# Patient Record
Sex: MALE | Race: WHITE | NOT HISPANIC OR LATINO | URBAN - METROPOLITAN AREA
[De-identification: names, ages, dates, MRNs, and addresses within clinical notes are randomized per-mention and may not be internally consistent; named-entity substitution may affect disease eponyms.]

---

## 2018-07-12 ENCOUNTER — EMERGENCY (EMERGENCY)
Facility: HOSPITAL | Age: 63
LOS: 0 days | Discharge: HOME | End: 2018-07-12
Attending: EMERGENCY MEDICINE | Admitting: EMERGENCY MEDICINE
Payer: COMMERCIAL

## 2018-07-12 VITALS
DIASTOLIC BLOOD PRESSURE: 89 MMHG | OXYGEN SATURATION: 100 % | HEART RATE: 87 BPM | TEMPERATURE: 98 F | SYSTOLIC BLOOD PRESSURE: 148 MMHG | RESPIRATION RATE: 18 BRPM

## 2018-07-12 VITALS — HEIGHT: 71 IN | WEIGHT: 194.01 LBS
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
## 2018-07-12 DIAGNOSIS — S89.81XA OTHER SPECIFIED INJURIES OF RIGHT LOWER LEG, INITIAL ENCOUNTER: ICD-10-CM
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
## 2018-07-12 DIAGNOSIS — S82.841A DISPLACED BIMALLEOLAR FRACTURE OF RIGHT LOWER LEG, INITIAL ENCOUNTER FOR CLOSED FRACTURE: ICD-10-CM

## 2018-07-12 PROCEDURE — 73590 X-RAY EXAM OF LOWER LEG: CPT | Mod: 26,RT

## 2018-07-12 PROCEDURE — 72190 X-RAY EXAM OF PELVIS: CPT | Mod: 26

## 2018-07-12 PROCEDURE — 99284 EMERGENCY DEPT VISIT MOD MDM: CPT

## 2018-07-12 PROCEDURE — 73630 X-RAY EXAM OF FOOT: CPT | Mod: 26,RT

## 2018-07-12 PROCEDURE — 73560 X-RAY EXAM OF KNEE 1 OR 2: CPT | Mod: 26,RT

## 2018-07-12 PROCEDURE — 73610 X-RAY EXAM OF ANKLE: CPT | Mod: 26,RT

## 2018-07-12 RX ORDER — IBUPROFEN 200 MG
600 TABLET ORAL ONCE
Qty: 0 | Refills: 0 | Status: COMPLETED | OUTPATIENT
Start: 2018-07-12 | End: 2018-07-12

## 2018-07-12 RX ORDER — ACETAMINOPHEN 500 MG
975 TABLET ORAL ONCE
Qty: 0 | Refills: 0 | Status: COMPLETED | OUTPATIENT
Start: 2018-07-12 | End: 2018-07-12

## 2018-07-12 RX ADMIN — Medication 975 MILLIGRAM(S): at 19:37

## 2018-07-12 RX ADMIN — Medication 600 MILLIGRAM(S): at 19:37

## 2018-07-12 NOTE — ED STATDOCS - ATTENDING CONTRIBUTION TO CARE
I, Rk Dennis MD, personally saw the patient with ACP.  I have personally performed a face to face diagnostic evaluation on this patient.  I have reviewed the ACP note and agree with the history, exam, and plan of care, except as noted.

## 2018-07-12 NOTE — ED STATDOCS - OBJECTIVE STATEMENT
64 y/o male with a PMHx of  HTN, HLD presents to the ED s/p fall five feet from ladder today. Pt landed on right ankle. +right ankle pain, right knee pain. Pt notes the ladder was on uneven ground, which is why the ladder fell. Denies LOC. Pt saw PMD today after fall and was sent into ED. Pt took ibuprofen today. Denies EtOH use, illicit drug use, tobacco use. NKDA. No fever or any other acute complaints at this time. 64 y/o male with a PMHx of  HTN, HLD presents to the ED s/p fall five feet from ladder today. Pt landed on right ankle. +right ankle pain, right knee pain. Pt notes the ladder was on uneven ground, which is why the ladder fell.  Pt landed stumbling on his right ankle. Denies fall to the ground, head strike, neck injury, LOC, other injuries. Pt saw PMD today after fall and was sent into ED. Pt took ibuprofen today. Denies EtOH use, illicit drug use, tobacco use. NKDA. No fever or any other acute complaints at this time.

## 2018-07-12 NOTE — ED STATDOCS - PROGRESS NOTE DETAILS
63 yr. old male PMH: HTN,HLD presents to ED with injury to right ankle and right knee after falling off ladder today. Reports his ladder was on uneven ground and feel onto foot. No head injury or LOC. Not on any blood thinners. Seen and examined by attending in Intake. Plan: X-Rays and ortho consult. Will F/U with results and re evaluate. Dianely[facility] Ortho came to see pt. SPlinted the pt and to f/u with Dr. Orozco. Was discussed with pt. Pt aware. -Julio Smith PA-C KANDY Closed reduction without sedation performed in the ED . PT will follow up with Dr. Alvarado orthopedics in 48 hours for repeat evaluation.  Splint placed and crutches provided.  I reviewed the alarm symptoms of this patient's diagnosis and discussed criteria for their return to the emergency department.  I instructed the patient to return to the emergency department with any alarm symptoms for their specific diagnosis including pain, numbness, any worsening symptoms, and any other concerns.  I instructed this patient to call their primary doctor today, to inform them of their visit to the emergency department, and to obtain a repeat evaluation in the next 24 hours.  This patient understood and agreed with our plan for follow up.  At the time of discharge this patient remained in stable condition, in no acute distress, with stable vital signs.

## 2018-07-12 NOTE — ED STATDOCS - CARE PLAN
Principal Discharge DX:	Other closed fracture of distal end of right fibula, initial encounter  Secondary Diagnosis:	Knee injury, right, initial encounter  Secondary Diagnosis:	Fall from ladder, initial encounter

## 2018-07-12 NOTE — ED STATDOCS - PHYSICAL EXAMINATION
JW Attending Exam: No obvious skull fracture, depression, hematoma, ecchymosis,  or signs of head trauma.  No palpable skull tenderness or deformity.  No campbell sign, raccoon eyes, CSF rhinorrhea, CSF otorrhea, hematotympanum, or other sign of basilar skull fracture.  No maxillary or facial ecchymosis, hematoma, deformity, or palpable tenderness.   No septal hematoma. No midline cervical, thoracic, or lumbar tenderness, step off, fracture, or deformity.  No sign of thoracic trama.  No sign of abdominal or pelvic trauma.  Right ankle and knee +TTP, 2+DP and Tibial pulses distally neurovascularly intact.

## 2018-07-12 NOTE — CONSULT NOTE ADULT - ASSESSMENT
A: 63M with Right ankle huseyin equivalent Fx    P;  NWB RLE   maintain splint keep clean and dry   informed patient that this will need surgical intervention. Pt wishes to be discharged home and schedule surgery as outpatient   elevation   cold compress  pain control   will discuss with attending on call Dr corral A: 63M with Right ankle huseyin equivalent Fx    P:  NWB RLE   maintain splint keep clean and dry   informed patient that this will need surgical intervention. Pt wishes to be discharged home and schedule surgery as outpatient   elevation   cold compress  pain control   will discuss with attending on call Dr shayna HATHAWAY in office w/. Dr corral within 1 week  Ortho stable for DC

## 2018-07-12 NOTE — CONSULT NOTE ADULT - SUBJECTIVE AND OBJECTIVE BOX
Asked by ED to evaluate 64 y/o M with right ankle pain and swelling s/p fall off ladder today this morning approx 5 feet. Pt has been ambulating since the fall.  denies head strike or Loc, denies N/T RLE.  X rays in ED show right fibula fracture with questionable widened mortise. Stress view of ankle confirms unstable mortise. no other extremity complaints     PMH: HTN, HLD    PE Right ankle   skin intact + swelling   tender over distal fibula   tender medially   FROM toes  SILT   cap refill brisk   calf soft non tender  FROM knee , non tender knee     procedure: Right ankle close reduction attempted, Trilam splint applied. pt tolerated procedure well, remained NVI    post reduction films: Right ankle Asked by ED to evaluate 64 y/o M with right ankle pain and swelling s/p fall off ladder today this morning approx 5 feet. Pt has been ambulating since the fall.  denies head strike or Loc, denies N/T RLE.  X rays in ED show right fibula fracture with questionable widened mortise. Stress view of ankle confirms unstable mortise. no other extremity complaints     PMH: HTN, HLD    PE Right ankle   skin intact + swelling   tender over distal fibula   tender medially   FROM toes  SILT   cap refill brisk   calf soft non tender  FROM knee , non tender knee     procedure: Right ankle close reduction attempted, Trilam splint applied. pt tolerated procedure well, remained NVI    post reduction films: Right ankle fx in acceptable alignment

## 2018-07-12 NOTE — ED STATDOCS - MEDICAL DECISION MAKING DETAILS
64 y/o male with a PMHx of  HTN, HLD presents to the ED s/p fall five feet from ladder today. Pt landed on right ankle. +right ankle pain, right knee pain.  VSS WNL.  Exam suggestive of ankle Fx vs knee Fx.  DDX includes strain/sprain.  XR, pain control, orthopedics consultation as required.  Symptomatic treatment.  Reassess.

## 2018-07-18 PROBLEM — I10 ESSENTIAL (PRIMARY) HYPERTENSION: Chronic | Status: ACTIVE | Noted: 2018-07-16

## 2018-07-18 PROBLEM — E78.5 HYPERLIPIDEMIA, UNSPECIFIED: Chronic | Status: ACTIVE | Noted: 2018-07-16

## 2018-07-19 ENCOUNTER — OUTPATIENT (OUTPATIENT)
Dept: OUTPATIENT SERVICES | Facility: HOSPITAL | Age: 63
LOS: 1 days | Discharge: HOME | End: 2018-07-19
Payer: COMMERCIAL

## 2018-07-19 VITALS — WEIGHT: 194.01 LBS | HEIGHT: 71 IN

## 2018-07-19 DIAGNOSIS — Z90.89 ACQUIRED ABSENCE OF OTHER ORGANS: Chronic | ICD-10-CM

## 2018-07-19 DIAGNOSIS — S82.63XA DISPLACED FRACTURE OF LATERAL MALLEOLUS OF UNSPECIFIED FIBULA, INITIAL ENCOUNTER FOR CLOSED FRACTURE: ICD-10-CM

## 2018-07-19 LAB
ABO RH CONFIRMATION: SIGNIFICANT CHANGE UP
ANION GAP SERPL CALC-SCNC: 10 MMOL/L — SIGNIFICANT CHANGE UP (ref 5–17)
APPEARANCE UR: CLEAR — SIGNIFICANT CHANGE UP
APTT BLD: 28.4 SEC — SIGNIFICANT CHANGE UP (ref 27.5–37.4)
BACTERIA # UR AUTO: ABNORMAL
BASOPHILS # BLD AUTO: 0.04 K/UL — SIGNIFICANT CHANGE UP (ref 0–0.2)
BASOPHILS NFR BLD AUTO: 0.4 % — SIGNIFICANT CHANGE UP (ref 0–2)
BILIRUB UR-MCNC: NEGATIVE — SIGNIFICANT CHANGE UP
BLD GP AB SCN SERPL QL: SIGNIFICANT CHANGE UP
BUN SERPL-MCNC: 24 MG/DL — HIGH (ref 7–23)
CALCIUM SERPL-MCNC: 9.4 MG/DL — SIGNIFICANT CHANGE UP (ref 8.5–10.1)
CHLORIDE SERPL-SCNC: 98 MMOL/L — SIGNIFICANT CHANGE UP (ref 96–108)
CO2 SERPL-SCNC: 29 MMOL/L — SIGNIFICANT CHANGE UP (ref 22–31)
COLOR SPEC: YELLOW — SIGNIFICANT CHANGE UP
COMMENT - URINE: SIGNIFICANT CHANGE UP
CREAT SERPL-MCNC: 1.07 MG/DL — SIGNIFICANT CHANGE UP (ref 0.5–1.3)
DIFF PNL FLD: ABNORMAL
EOSINOPHIL # BLD AUTO: 0.09 K/UL — SIGNIFICANT CHANGE UP (ref 0–0.5)
EOSINOPHIL NFR BLD AUTO: 0.9 % — SIGNIFICANT CHANGE UP (ref 0–6)
EPI CELLS # UR: SIGNIFICANT CHANGE UP
GLUCOSE SERPL-MCNC: 90 MG/DL — SIGNIFICANT CHANGE UP (ref 70–99)
GLUCOSE UR QL: NEGATIVE MG/DL — SIGNIFICANT CHANGE UP
HCT VFR BLD CALC: 43.5 % — SIGNIFICANT CHANGE UP (ref 39–50)
HGB BLD-MCNC: 15.1 G/DL — SIGNIFICANT CHANGE UP (ref 13–17)
IMM GRANULOCYTES NFR BLD AUTO: 1.1 % — SIGNIFICANT CHANGE UP (ref 0–1.5)
INR BLD: 1.08 RATIO — SIGNIFICANT CHANGE UP (ref 0.88–1.16)
KETONES UR-MCNC: ABNORMAL
LEUKOCYTE ESTERASE UR-ACNC: ABNORMAL
LYMPHOCYTES # BLD AUTO: 1.31 K/UL — SIGNIFICANT CHANGE UP (ref 1–3.3)
LYMPHOCYTES # BLD AUTO: 13.6 % — SIGNIFICANT CHANGE UP (ref 13–44)
MCHC RBC-ENTMCNC: 30.1 PG — SIGNIFICANT CHANGE UP (ref 27–34)
MCHC RBC-ENTMCNC: 34.7 GM/DL — SIGNIFICANT CHANGE UP (ref 32–36)
MCV RBC AUTO: 86.7 FL — SIGNIFICANT CHANGE UP (ref 80–100)
MONOCYTES # BLD AUTO: 0.98 K/UL — HIGH (ref 0–0.9)
MONOCYTES NFR BLD AUTO: 10.2 % — SIGNIFICANT CHANGE UP (ref 2–14)
NEUTROPHILS # BLD AUTO: 7.09 K/UL — SIGNIFICANT CHANGE UP (ref 1.8–7.4)
NEUTROPHILS NFR BLD AUTO: 73.8 % — SIGNIFICANT CHANGE UP (ref 43–77)
NITRITE UR-MCNC: NEGATIVE — SIGNIFICANT CHANGE UP
NRBC # BLD: 0 /100 WBCS — SIGNIFICANT CHANGE UP (ref 0–0)
PH UR: 7 — SIGNIFICANT CHANGE UP (ref 5–8)
PLATELET # BLD AUTO: 361 K/UL — SIGNIFICANT CHANGE UP (ref 150–400)
POTASSIUM SERPL-MCNC: 3.3 MMOL/L — LOW (ref 3.5–5.3)
POTASSIUM SERPL-SCNC: 3.3 MMOL/L — LOW (ref 3.5–5.3)
PROT UR-MCNC: 15 MG/DL
PROTHROM AB SERPL-ACNC: 11.7 SEC — SIGNIFICANT CHANGE UP (ref 9.8–12.7)
RBC # BLD: 5.02 M/UL — SIGNIFICANT CHANGE UP (ref 4.2–5.8)
RBC # FLD: 12.9 % — SIGNIFICANT CHANGE UP (ref 10.3–14.5)
RBC CASTS # UR COMP ASSIST: SIGNIFICANT CHANGE UP /HPF (ref 0–4)
SODIUM SERPL-SCNC: 137 MMOL/L — SIGNIFICANT CHANGE UP (ref 135–145)
SP GR SPEC: 1.01 — SIGNIFICANT CHANGE UP (ref 1.01–1.02)
TYPE + AB SCN PNL BLD: SIGNIFICANT CHANGE UP
UROBILINOGEN FLD QL: 4 MG/DL
WBC # BLD: 9.62 K/UL — SIGNIFICANT CHANGE UP (ref 3.8–10.5)
WBC # FLD AUTO: 9.62 K/UL — SIGNIFICANT CHANGE UP (ref 3.8–10.5)
WBC UR QL: SIGNIFICANT CHANGE UP

## 2018-07-19 PROCEDURE — 71046 X-RAY EXAM CHEST 2 VIEWS: CPT | Mod: 26

## 2018-07-19 PROCEDURE — 93010 ELECTROCARDIOGRAM REPORT: CPT

## 2018-07-19 NOTE — H&P PST ADULT - NSANTHOSAYNRD_GEN_A_CORE
No. RAMIRO screening performed.  STOP BANG Legend: 0-2 = LOW Risk; 3-4 = INTERMEDIATE Risk; 5-8 = HIGH Risk

## 2018-07-19 NOTE — H&P PST ADULT - ASSESSMENT
62 y/o male with fracture lateral malleolus right ankle. Pt reports "I fell off a ladder." Pt went to his PMD who sent him to  ER. Soft cast was applied in ER. Pt denies right ankle pain. Scheduled for ORIF right ankle with Dr. Galicia.    Plan  1. Stop all NSAIDS, herbal supplements and vitamins for 7 days.  2. NPO at midnight.  3. Use EZ sponges as directed  4. Use mupirocin as directed 62 y/o male with fracture lateral malleolus right ankle. Pt reports "I fell off a ladder." Pt went to his PMD who sent him to  ER. Splint was applied in ER. Pt denies right ankle pain. Scheduled for ORIF right ankle with Dr. Galicia.    Plan  1. Stop all NSAIDS, herbal supplements and vitamins for 7 days.  2. NPO at midnight.  3. Use EZ sponges as directed  4. Use mupirocin as directed

## 2018-07-19 NOTE — H&P PST ADULT - MUSCULOSKELETAL
negative detailed exam right lower extremity cast intact, toes swollen, mobile with good capillary refills right lower extremity immobilizer intact, toes swollen, mobile with good capillary refills right lower extremity immobilizer intact, toes swollen, toes mobile with good capillary refills

## 2018-07-19 NOTE — H&P PST ADULT - PMH
Fracture of ankle  fracture lateral malleolus, closed right  HLD (hyperlipidemia)    HTN (hypertension)    OA (osteoarthritis) of knee

## 2018-07-19 NOTE — H&P PST ADULT - HISTORY OF PRESENT ILLNESS
64 y/o male with fracture lateral malleolus right ankle. Pt reports "I fell off a ladder." Pt went to his PMD who sent him to  ER. Soft cast was applied in ER. Pt denies right ankle pain. Here today for PST for ORIF right ankle. 62 y/o male with fracture lateral malleolus right ankle. Pt reports "I fell off a ladder." Pt went to his PMD who sent him to  ER. Splint was applied in ER. Pt denies right ankle pain. Here today for PST for ORIF right ankle.

## 2018-07-20 RX ORDER — FENTANYL CITRATE 50 UG/ML
50 INJECTION INTRAVENOUS
Qty: 0 | Refills: 0 | Status: DISCONTINUED | OUTPATIENT
Start: 2018-07-23 | End: 2018-07-23

## 2018-07-20 RX ORDER — OXYCODONE HYDROCHLORIDE 5 MG/1
10 TABLET ORAL EVERY 6 HOURS
Qty: 0 | Refills: 0 | Status: DISCONTINUED | OUTPATIENT
Start: 2018-07-23 | End: 2018-07-23

## 2018-07-23 ENCOUNTER — OUTPATIENT (OUTPATIENT)
Dept: OUTPATIENT SERVICES | Facility: HOSPITAL | Age: 63
LOS: 1 days | Discharge: HOME | End: 2018-07-23

## 2018-07-23 VITALS
SYSTOLIC BLOOD PRESSURE: 137 MMHG | DIASTOLIC BLOOD PRESSURE: 77 MMHG | TEMPERATURE: 98 F | HEART RATE: 76 BPM | RESPIRATION RATE: 16 BRPM | OXYGEN SATURATION: 99 %

## 2018-07-23 VITALS
SYSTOLIC BLOOD PRESSURE: 111 MMHG | OXYGEN SATURATION: 100 % | HEIGHT: 70 IN | RESPIRATION RATE: 16 BRPM | DIASTOLIC BLOOD PRESSURE: 73 MMHG | WEIGHT: 190.04 LBS | TEMPERATURE: 99 F | HEART RATE: 73 BPM

## 2018-07-23 DIAGNOSIS — Z90.89 ACQUIRED ABSENCE OF OTHER ORGANS: Chronic | ICD-10-CM

## 2018-07-23 DIAGNOSIS — Z87.438 PERSONAL HISTORY OF OTHER DISEASES OF MALE GENITAL ORGANS: Chronic | ICD-10-CM

## 2018-07-23 RX ORDER — AZTREONAM 2 G
1 VIAL (EA) INJECTION
Qty: 20 | Refills: 0
Start: 2018-07-23 | End: 2018-08-01

## 2018-07-23 RX ORDER — CYCLOBENZAPRINE HYDROCHLORIDE 10 MG/1
1 TABLET, FILM COATED ORAL
Qty: 14 | Refills: 0
Start: 2018-07-23 | End: 2018-08-05

## 2018-07-23 RX ORDER — ONDANSETRON 8 MG/1
4 TABLET, FILM COATED ORAL ONCE
Qty: 0 | Refills: 0 | Status: DISCONTINUED | OUTPATIENT
Start: 2018-07-23 | End: 2018-07-23

## 2018-07-23 RX ORDER — SODIUM CHLORIDE 9 MG/ML
1000 INJECTION INTRAMUSCULAR; INTRAVENOUS; SUBCUTANEOUS
Qty: 0 | Refills: 0 | Status: DISCONTINUED | OUTPATIENT
Start: 2018-07-23 | End: 2018-08-07

## 2018-07-23 RX ORDER — SODIUM CHLORIDE 9 MG/ML
1000 INJECTION, SOLUTION INTRAVENOUS
Qty: 0 | Refills: 0 | Status: DISCONTINUED | OUTPATIENT
Start: 2018-07-23 | End: 2018-07-23

## 2018-07-23 RX ORDER — ASPIRIN/CALCIUM CARB/MAGNESIUM 324 MG
1 TABLET ORAL
Qty: 60 | Refills: 0
Start: 2018-07-23 | End: 2018-08-21

## 2018-07-23 RX ADMIN — FENTANYL CITRATE 50 MICROGRAM(S): 50 INJECTION INTRAVENOUS at 15:24

## 2018-07-23 RX ADMIN — OXYCODONE HYDROCHLORIDE 10 MILLIGRAM(S): 5 TABLET ORAL at 15:25

## 2018-07-23 RX ADMIN — SODIUM CHLORIDE 75 MILLILITER(S): 9 INJECTION, SOLUTION INTRAVENOUS at 15:28

## 2018-07-23 NOTE — BRIEF OPERATIVE NOTE - PROCEDURE
<<-----Click on this checkbox to enter Procedure Open reduction and internal fixation (ORIF) of fracture of ankle  07/23/2018    Active  [place]

## 2018-07-23 NOTE — ASU DISCHARGE PLAN (ADULT/PEDIATRIC). - NOTIFY
Pain not relieved by Medications/Swelling that continues/Numbness, color, or temperature change to extremity/Persistent Nausea and Vomiting/Bleeding that does not stop/Fever greater than 101

## 2018-07-23 NOTE — ASU PATIENT PROFILE, ADULT - PSH
History of tonsillectomy H/O undescended testicle  left, surgery to correct  History of tonsillectomy

## 2018-07-23 NOTE — ASU DISCHARGE PLAN (ADULT/PEDIATRIC). - SPECIAL INSTRUCTIONS
1. Rest/ice/elevate  2. Keep splint clean/dry  3. Pain meds prn, rx sent to pharmacy  4. NWB in splint  5. F/u with Dr Galicia in 10-14 days, call office for appt 1. Rest/ice/elevate  2. Keep splint clean/dry  3. Pain meds prn, rx sent to pharmacy  4. ASA BID for dvt ppx, Rx sent  5. NWB in splint  6.  F/u with Dr Galicia in 14 days, call office for appt

## 2018-07-23 NOTE — ASU PATIENT PROFILE, ADULT - PMH
Fracture of ankle  fracture lateral malleolus, closed right  HLD (hyperlipidemia)    HTN (hypertension)    OA (osteoarthritis) of knee Fracture of ankle  fracture lateral malleolus, closed right  HLD (hyperlipidemia)    HTN (hypertension)    OA (osteoarthritis) of knee  right knee  Undescended testicle  left

## 2018-07-23 NOTE — ASU PATIENT PROFILE, ADULT - VISION (WITH CORRECTIVE LENSES IF THE PATIENT USUALLY WEARS THEM):
Normal vision: sees adequately in most situations; can see medication labels, newsprint glasses for reading and distance/Partially impaired: cannot see medication labels or newsprint, but can see obstacles in path, and the surrounding layout; can count fingers at arm's length

## 2018-07-23 NOTE — ASU PREOP CHECKLIST - MUPIRONCIN COMMENTS
mupirocin started day of surgery mupirocin started day of surgery. patient has splint on right ankle

## 2018-07-23 NOTE — ASU DISCHARGE PLAN (ADULT/PEDIATRIC). - MEDICATION SUMMARY - MEDICATIONS TO TAKE
I will START or STAY ON the medications listed below when I get home from the hospital:    Percocet 5/325 oral tablet  -- 1 tab(s) by mouth every 6 hours, As Needed MDD:4   -- Caution federal law prohibits the transfer of this drug to any person other  than the person for whom it was prescribed.  May cause drowsiness.  Alcohol may intensify this effect.  Use care when operating dangerous machinery.  This prescription cannot be refilled.  This product contains acetaminophen.  Do not use  with any other product containing acetaminophen to prevent possible liver damage.  Using more of this medication than prescribed may cause serious breathing problems.    -- Indication: For prn pain     losartan 100 mg oral tablet  -- 1 tab(s) by mouth once a day (at bedtime)  -- Indication: For per pmd    simvastatin 20 mg oral tablet  -- 1 tab(s) by mouth once a day (at bedtime)  -- Indication: For per pmd    chlorthalidone 25 mg oral tablet  -- 1 tab(s) by mouth once a day  -- Indication: For per pmd    potassium chloride 10 mEq oral tablet, extended release  -- 1 tab(s) by mouth once a day  -- Indication: For per pmd I will START or STAY ON the medications listed below when I get home from the hospital:    Percocet 5/325 oral tablet  -- 1 tab(s) by mouth every 6 hours, As Needed MDD:4   -- Caution federal law prohibits the transfer of this drug to any person other  than the person for whom it was prescribed.  May cause drowsiness.  Alcohol may intensify this effect.  Use care when operating dangerous machinery.  This prescription cannot be refilled.  This product contains acetaminophen.  Do not use  with any other product containing acetaminophen to prevent possible liver damage.  Using more of this medication than prescribed may cause serious breathing problems.    -- Indication: For prn pain     aspirin 325 mg oral delayed release tablet  -- 1 tab(s) by mouth 2 times a day MDD:2  -- Swallow whole.  Do not crush.  Take with food or milk.    -- Indication: For Dvt ppx    losartan 100 mg oral tablet  -- 1 tab(s) by mouth once a day (at bedtime)  -- Indication: For per pmd    simvastatin 20 mg oral tablet  -- 1 tab(s) by mouth once a day (at bedtime)  -- Indication: For per pmd    chlorthalidone 25 mg oral tablet  -- 1 tab(s) by mouth once a day  -- Indication: For per pmd    potassium chloride 10 mEq oral tablet, extended release  -- 1 tab(s) by mouth once a day  -- Indication: For per pmd    cyclobenzaprine 10 mg oral tablet  -- 1 tab(s) by mouth once a day (at bedtime), As Needed MDD:1   -- May cause drowsiness.  Alcohol may intensify this effect.  Use care when operating dangerous machinery.  Obtain medical advice before taking any non-prescription drugs as some may affect the action of this medication.    -- Indication: For prn muscle spasm I will START or STAY ON the medications listed below when I get home from the hospital:    Percocet 5/325 oral tablet  -- 1 tab(s) by mouth every 6 hours, As Needed MDD:4   -- Caution federal law prohibits the transfer of this drug to any person other  than the person for whom it was prescribed.  May cause drowsiness.  Alcohol may intensify this effect.  Use care when operating dangerous machinery.  This prescription cannot be refilled.  This product contains acetaminophen.  Do not use  with any other product containing acetaminophen to prevent possible liver damage.  Using more of this medication than prescribed may cause serious breathing problems.    -- Indication: For prn pain     aspirin 325 mg oral delayed release tablet  -- 1 tab(s) by mouth 2 times a day MDD:2  -- Swallow whole.  Do not crush.  Take with food or milk.    -- Indication: For Dvt ppx    losartan 100 mg oral tablet  -- 1 tab(s) by mouth once a day (at bedtime)  -- Indication: For per pmd    simvastatin 20 mg oral tablet  -- 1 tab(s) by mouth once a day (at bedtime)  -- Indication: For per pmd    chlorthalidone 25 mg oral tablet  -- 1 tab(s) by mouth once a day  -- Indication: For per pmd    potassium chloride 10 mEq oral tablet, extended release  -- 1 tab(s) by mouth once a day  -- Indication: For per pmd    cyclobenzaprine 10 mg oral tablet  -- 1 tab(s) by mouth once a day (at bedtime), As Needed MDD:1   -- May cause drowsiness.  Alcohol may intensify this effect.  Use care when operating dangerous machinery.  Obtain medical advice before taking any non-prescription drugs as some may affect the action of this medication.    -- Indication: For prn muscle spasm    Bactrim  mg-160 mg oral tablet  -- 1 tab(s) by mouth 2 times a day MDD:2  -- Avoid prolonged or excessive exposure to direct and/or artificial sunlight while taking this medication.  Finish all this medication unless otherwise directed by prescriber.  Medication should be taken with plenty of water.    -- Indication: For prophylaxis
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
## 2018-07-26 DIAGNOSIS — S82.841A DISPLACED BIMALLEOLAR FRACTURE OF RIGHT LOWER LEG, INITIAL ENCOUNTER FOR CLOSED FRACTURE: ICD-10-CM

## 2018-07-26 DIAGNOSIS — M17.9 OSTEOARTHRITIS OF KNEE, UNSPECIFIED: ICD-10-CM

## 2018-07-26 DIAGNOSIS — Z87.891 PERSONAL HISTORY OF NICOTINE DEPENDENCE: ICD-10-CM

## 2018-07-26 DIAGNOSIS — Z80.1 FAMILY HISTORY OF MALIGNANT NEOPLASM OF TRACHEA, BRONCHUS AND LUNG: ICD-10-CM
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
## 2018-07-26 DIAGNOSIS — E78.5 HYPERLIPIDEMIA, UNSPECIFIED: ICD-10-CM

## 2018-07-26 DIAGNOSIS — I10 ESSENTIAL (PRIMARY) HYPERTENSION: ICD-10-CM
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
## 2022-07-12 ENCOUNTER — INPATIENT (INPATIENT)
Facility: HOSPITAL | Age: 67
LOS: 1 days | Discharge: ANOTHER HOSPITAL | DRG: 282 | End: 2022-07-14
Attending: HOSPITALIST | Admitting: HOSPITALIST
Payer: COMMERCIAL

## 2022-07-12 VITALS — WEIGHT: 199.96 LBS | HEIGHT: 70 IN

## 2022-07-12 DIAGNOSIS — I21.4 NON-ST ELEVATION (NSTEMI) MYOCARDIAL INFARCTION: ICD-10-CM

## 2022-07-12 DIAGNOSIS — Z90.89 ACQUIRED ABSENCE OF OTHER ORGANS: Chronic | ICD-10-CM

## 2022-07-12 DIAGNOSIS — Z87.438 PERSONAL HISTORY OF OTHER DISEASES OF MALE GENITAL ORGANS: Chronic | ICD-10-CM

## 2022-07-12 LAB
ADD ON TEST-SPECIMEN IN LAB: SIGNIFICANT CHANGE UP
ALBUMIN SERPL ELPH-MCNC: 3.8 G/DL — SIGNIFICANT CHANGE UP (ref 3.3–5)
ALP SERPL-CCNC: 91 U/L — SIGNIFICANT CHANGE UP (ref 40–120)
ALT FLD-CCNC: 124 U/L — HIGH (ref 12–78)
ANION GAP SERPL CALC-SCNC: 5 MMOL/L — SIGNIFICANT CHANGE UP (ref 5–17)
APTT BLD: 29.1 SEC — SIGNIFICANT CHANGE UP (ref 27.5–35.5)
AST SERPL-CCNC: 178 U/L — HIGH (ref 15–37)
BASOPHILS # BLD AUTO: 0.04 K/UL — SIGNIFICANT CHANGE UP (ref 0–0.2)
BASOPHILS NFR BLD AUTO: 0.2 % — SIGNIFICANT CHANGE UP (ref 0–2)
BILIRUB SERPL-MCNC: 2.6 MG/DL — HIGH (ref 0.2–1.2)
BUN SERPL-MCNC: 24 MG/DL — HIGH (ref 7–23)
CALCIUM SERPL-MCNC: 9.3 MG/DL — SIGNIFICANT CHANGE UP (ref 8.5–10.1)
CHLORIDE SERPL-SCNC: 97 MMOL/L — SIGNIFICANT CHANGE UP (ref 96–108)
CO2 SERPL-SCNC: 33 MMOL/L — HIGH (ref 22–31)
CREAT SERPL-MCNC: 1.36 MG/DL — HIGH (ref 0.5–1.3)
D DIMER BLD IA.RAPID-MCNC: 155 NG/ML DDU — SIGNIFICANT CHANGE UP
EGFR: 57 ML/MIN/1.73M2 — LOW
EOSINOPHIL # BLD AUTO: 0 K/UL — SIGNIFICANT CHANGE UP (ref 0–0.5)
EOSINOPHIL NFR BLD AUTO: 0 % — SIGNIFICANT CHANGE UP (ref 0–6)
FLUAV AG NPH QL: SIGNIFICANT CHANGE UP
FLUBV AG NPH QL: SIGNIFICANT CHANGE UP
GLUCOSE SERPL-MCNC: 123 MG/DL — HIGH (ref 70–99)
HCT VFR BLD CALC: 43.2 % — SIGNIFICANT CHANGE UP (ref 39–50)
HGB BLD-MCNC: 14.6 G/DL — SIGNIFICANT CHANGE UP (ref 13–17)
IMM GRANULOCYTES NFR BLD AUTO: 0.7 % — SIGNIFICANT CHANGE UP (ref 0–1.5)
INR BLD: 1.19 RATIO — HIGH (ref 0.88–1.16)
LYMPHOCYTES # BLD AUTO: 0.98 K/UL — LOW (ref 1–3.3)
LYMPHOCYTES # BLD AUTO: 5.8 % — LOW (ref 13–44)
MAGNESIUM SERPL-MCNC: 2.8 MG/DL — HIGH (ref 1.6–2.6)
MCHC RBC-ENTMCNC: 29.4 PG — SIGNIFICANT CHANGE UP (ref 27–34)
MCHC RBC-ENTMCNC: 33.8 GM/DL — SIGNIFICANT CHANGE UP (ref 32–36)
MCV RBC AUTO: 87.1 FL — SIGNIFICANT CHANGE UP (ref 80–100)
MONOCYTES # BLD AUTO: 2.22 K/UL — HIGH (ref 0–0.9)
MONOCYTES NFR BLD AUTO: 13.1 % — SIGNIFICANT CHANGE UP (ref 2–14)
NEUTROPHILS # BLD AUTO: 13.54 K/UL — HIGH (ref 1.8–7.4)
NEUTROPHILS NFR BLD AUTO: 80.2 % — HIGH (ref 43–77)
NT-PROBNP SERPL-SCNC: 2117 PG/ML — HIGH (ref 0–125)
PLATELET # BLD AUTO: 269 K/UL — SIGNIFICANT CHANGE UP (ref 150–400)
POTASSIUM SERPL-MCNC: 2.6 MMOL/L — CRITICAL LOW (ref 3.5–5.3)
POTASSIUM SERPL-SCNC: 2.6 MMOL/L — CRITICAL LOW (ref 3.5–5.3)
PROT SERPL-MCNC: 7.5 GM/DL — SIGNIFICANT CHANGE UP (ref 6–8.3)
PROTHROM AB SERPL-ACNC: 13.8 SEC — HIGH (ref 10.5–13.4)
RBC # BLD: 4.96 M/UL — SIGNIFICANT CHANGE UP (ref 4.2–5.8)
RBC # FLD: 13.1 % — SIGNIFICANT CHANGE UP (ref 10.3–14.5)
RSV RNA NPH QL NAA+NON-PROBE: SIGNIFICANT CHANGE UP
SARS-COV-2 RNA SPEC QL NAA+PROBE: SIGNIFICANT CHANGE UP
SODIUM SERPL-SCNC: 135 MMOL/L — SIGNIFICANT CHANGE UP (ref 135–145)
TROPONIN I, HIGH SENSITIVITY RESULT: HIGH NG/L
WBC # BLD: 16.89 K/UL — HIGH (ref 3.8–10.5)
WBC # FLD AUTO: 16.89 K/UL — HIGH (ref 3.8–10.5)

## 2022-07-12 PROCEDURE — 84145 PROCALCITONIN (PCT): CPT

## 2022-07-12 PROCEDURE — 80048 BASIC METABOLIC PNL TOTAL CA: CPT

## 2022-07-12 PROCEDURE — 85025 COMPLETE CBC W/AUTO DIFF WBC: CPT

## 2022-07-12 PROCEDURE — 99285 EMERGENCY DEPT VISIT HI MDM: CPT

## 2022-07-12 PROCEDURE — 82550 ASSAY OF CK (CPK): CPT

## 2022-07-12 PROCEDURE — C1894: CPT

## 2022-07-12 PROCEDURE — 86803 HEPATITIS C AB TEST: CPT

## 2022-07-12 PROCEDURE — 80053 COMPREHEN METABOLIC PANEL: CPT

## 2022-07-12 PROCEDURE — 85610 PROTHROMBIN TIME: CPT

## 2022-07-12 PROCEDURE — 84484 ASSAY OF TROPONIN QUANT: CPT

## 2022-07-12 PROCEDURE — 93306 TTE W/DOPPLER COMPLETE: CPT

## 2022-07-12 PROCEDURE — 93458 L HRT ARTERY/VENTRICLE ANGIO: CPT

## 2022-07-12 PROCEDURE — 80061 LIPID PANEL: CPT

## 2022-07-12 PROCEDURE — C1769: CPT

## 2022-07-12 PROCEDURE — 85730 THROMBOPLASTIN TIME PARTIAL: CPT

## 2022-07-12 PROCEDURE — 83735 ASSAY OF MAGNESIUM: CPT

## 2022-07-12 PROCEDURE — 84100 ASSAY OF PHOSPHORUS: CPT

## 2022-07-12 PROCEDURE — 85027 COMPLETE CBC AUTOMATED: CPT

## 2022-07-12 PROCEDURE — 71046 X-RAY EXAM CHEST 2 VIEWS: CPT | Mod: 26

## 2022-07-12 PROCEDURE — 36415 COLL VENOUS BLD VENIPUNCTURE: CPT

## 2022-07-12 PROCEDURE — 84132 ASSAY OF SERUM POTASSIUM: CPT

## 2022-07-12 PROCEDURE — 93010 ELECTROCARDIOGRAM REPORT: CPT

## 2022-07-12 PROCEDURE — C1887: CPT

## 2022-07-12 RX ORDER — SIMVASTATIN 20 MG/1
1 TABLET, FILM COATED ORAL
Qty: 0 | Refills: 0 | DISCHARGE

## 2022-07-12 RX ORDER — HEPARIN SODIUM 5000 [USP'U]/ML
5000 INJECTION INTRAVENOUS; SUBCUTANEOUS ONCE
Refills: 0 | Status: COMPLETED | OUTPATIENT
Start: 2022-07-12 | End: 2022-07-12

## 2022-07-12 RX ORDER — HEPARIN SODIUM 5000 [USP'U]/ML
5300 INJECTION INTRAVENOUS; SUBCUTANEOUS EVERY 6 HOURS
Refills: 0 | Status: DISCONTINUED | OUTPATIENT
Start: 2022-07-12 | End: 2022-07-14

## 2022-07-12 RX ORDER — ASPIRIN/CALCIUM CARB/MAGNESIUM 324 MG
324 TABLET ORAL ONCE
Refills: 0 | Status: COMPLETED | OUTPATIENT
Start: 2022-07-12 | End: 2022-07-12

## 2022-07-12 RX ORDER — POTASSIUM CHLORIDE 20 MEQ
10 PACKET (EA) ORAL
Refills: 0 | Status: COMPLETED | OUTPATIENT
Start: 2022-07-12 | End: 2022-07-13

## 2022-07-12 RX ORDER — ACETAMINOPHEN 500 MG
650 TABLET ORAL ONCE
Refills: 0 | Status: COMPLETED | OUTPATIENT
Start: 2022-07-12 | End: 2022-07-12

## 2022-07-12 RX ORDER — POTASSIUM CHLORIDE 20 MEQ
10 PACKET (EA) ORAL ONCE
Refills: 0 | Status: COMPLETED | OUTPATIENT
Start: 2022-07-12 | End: 2022-07-12

## 2022-07-12 RX ORDER — CLOPIDOGREL BISULFATE 75 MG/1
300 TABLET, FILM COATED ORAL ONCE
Refills: 0 | Status: COMPLETED | OUTPATIENT
Start: 2022-07-12 | End: 2022-07-12

## 2022-07-12 RX ORDER — SODIUM CHLORIDE 9 MG/ML
1000 INJECTION INTRAMUSCULAR; INTRAVENOUS; SUBCUTANEOUS ONCE
Refills: 0 | Status: COMPLETED | OUTPATIENT
Start: 2022-07-12 | End: 2022-07-12

## 2022-07-12 RX ORDER — HEPARIN SODIUM 5000 [USP'U]/ML
INJECTION INTRAVENOUS; SUBCUTANEOUS
Qty: 25000 | Refills: 0 | Status: DISCONTINUED | OUTPATIENT
Start: 2022-07-12 | End: 2022-07-13

## 2022-07-12 RX ADMIN — Medication 100 MILLIEQUIVALENT(S): at 23:11

## 2022-07-12 RX ADMIN — CLOPIDOGREL BISULFATE 300 MILLIGRAM(S): 75 TABLET, FILM COATED ORAL at 23:15

## 2022-07-12 RX ADMIN — Medication 650 MILLIGRAM(S): at 20:18

## 2022-07-12 RX ADMIN — SODIUM CHLORIDE 1000 MILLILITER(S): 9 INJECTION INTRAMUSCULAR; INTRAVENOUS; SUBCUTANEOUS at 20:18

## 2022-07-12 RX ADMIN — HEPARIN SODIUM 5000 UNIT(S): 5000 INJECTION INTRAVENOUS; SUBCUTANEOUS at 23:10

## 2022-07-12 RX ADMIN — Medication 100 MILLIEQUIVALENT(S): at 21:27

## 2022-07-12 RX ADMIN — SODIUM CHLORIDE 1000 MILLILITER(S): 9 INJECTION INTRAMUSCULAR; INTRAVENOUS; SUBCUTANEOUS at 21:07

## 2022-07-12 RX ADMIN — HEPARIN SODIUM 1000 UNIT(S)/HR: 5000 INJECTION INTRAVENOUS; SUBCUTANEOUS at 23:11

## 2022-07-12 RX ADMIN — Medication 324 MILLIGRAM(S): at 21:26

## 2022-07-12 NOTE — PROVIDER CONTACT NOTE (CRITICAL VALUE NOTIFICATION) - DATE AND TIME:
12-Jul-2022 21:04 12-Jul-2022 21:00 Prematurity, birth weight 1,250-1,499 grams, with 31 completed weeks of gestation

## 2022-07-12 NOTE — ED STATDOCS - NSICDXPASTMEDICALHX_GEN_ALL_CORE_FT
PAST MEDICAL HISTORY:  Fracture of ankle fracture lateral malleolus, closed right    HLD (hyperlipidemia)     HTN (hypertension)     OA (osteoarthritis) of knee right knee    Undescended testicle left

## 2022-07-12 NOTE — ED STATDOCS - OBJECTIVE STATEMENT
66 y/o male of PMHx of HLD, HTN, OA, and Undescended testicle left presents to the Ed complaining of heartbeat on Sunday. p has a cold sweat, body aches, notes chest pain, back pain, SOB, dilated associated cough and went to urgent care and needed more evaluation. Pt PMD is Dr. Watson. pt was a former smoker. 68 y/o male of PMHx of HLD, HTN, OA, and Undescended testicle left presents to the Ed complaining of feeling un well since Sunday. patient had palpitations, cold sweat, body aches, notes chest pain, back pain, SOB. symptoms associated cough and went to urgent care and needed more evaluation. Pt PMD is Dr. Watson. pt was a former smoker.

## 2022-07-12 NOTE — ED ADULT NURSE NOTE - OBJECTIVE STATEMENT
pt sib urgent care for irregular heart beat possible afib. c/o pain when breathing. denies sob/n/v/d/f.

## 2022-07-12 NOTE — ED STATDOCS - PROGRESS NOTE DETAILS
68 yo male former smoker, with a PMH of htn, hld presents with diaphoresis and myalgia since Sunday. Pt states that he was have a lot of pain and then had pain to his back when he took a deep breath. Was seen at urgent care and told that he was in A fib. EKG in [facility] shows normal sinus. Denies sob, cp, vomiting, fever, cough, palpitations. Will check labs, CXR and reeval. -Julio Smith PA-C Trop elevated. Will order aspirin, repeat ekg. Page placed to Dr. Watson. Will admit. -Julio Smith PA-C Spoke with Dr. Watson who wants pt admitted and heparin and 300mg plavix ordered for the pt. Pt to be admitted. -Julio Smith PA-C

## 2022-07-12 NOTE — ED STATDOCS - ATTENDING APP SHARED VISIT CONTRIBUTION OF CARE
I, Bogdan Wright DO, personally saw the patient with ACP.  I performed a substantiative portion of the visit. I personally performed a face to face diagnostic evaluation on this patient and formulated the patient plan. Free text HPI, ROS, PE, MDM documented above by myself or with the aid of a scribe and represents my findings. The case was discussed with, and handed off to ACP who followed the case through to the re-evaluation and disposition.

## 2022-07-12 NOTE — ED ADULT NURSE NOTE - NSICDXPASTSURGICALHX_GEN_ALL_CORE_FT
PAST SURGICAL HISTORY:  H/O undescended testicle left, surgery to correct    History of tonsillectomy

## 2022-07-12 NOTE — ED ADULT NURSE NOTE - NSICDXFAMILYHX_GEN_ALL_CORE_FT
Telephone Encounter by Natalie Crawford CMA at 05/10/17 04:17 PM     Author:  Natalie Crawford CMA Service:  (none) Author Type:  Certified Medical Assistant     Filed:  05/10/17 05:47 PM Encounter Date:  5/10/2017 Status:  Signed     :  Natalie Crawford CMA (Certified Medical Assistant)            Spoke with mom and she stated that she [id number] of Vyvanse is doing well but would like to increase to 20MG.[LEO.1M]  I[LEO.2M] asked[LEO.1M] if their were any additional concerns as to why she wants to increase the medication to 20mg.  Mom states that patient is still having issues. Not turning in her homework because she forgets, the medication is wearing off early, not completing assignments.   Please Advise  MESSAGE routed to[[email or web address]] JULIO CESAR Charles[[email or web address]]  Electronically Signed by:    Natalie Crawford CMA , 5/10/2017[DR1.3T]            Revision History        User Key Date/Time User Provider Type Action    > DR1.3 05/10/17 05:47 PM Natalie Crawford CMA Certified Medical Assistant Sign     DR1.2 05/10/17 05:37 PM Natalie Crawford CMA Certified Medical Assistant      DR1.1 05/10/17 04:24 PM Natalie Crawford CMA Certified Medical Assistant     M - Manual, T - Template             FAMILY HISTORY:  Father  Still living? Unknown  Family history of lung cancer, Age at diagnosis: Age Unknown

## 2022-07-12 NOTE — ED STATDOCS - CARE PLAN
Principal Discharge DX:	NSTEMI (non-ST elevation myocardial infarction)  Secondary Diagnosis:	Acute hypokalemia   1

## 2022-07-12 NOTE — ED STATDOCS - CLINICAL SUMMARY MEDICAL DECISION MAKING FREE TEXT BOX
here with likely viral syndrome given diaereses and pluripartite chest pain. rule out ACS, PE, PNA. here with possible viral syndrome, given diaphoresis and pleuritic chest pain will rule out ACS, PE, PNA.

## 2022-07-13 PROBLEM — Q53.9 UNDESCENDED TESTICLE, UNSPECIFIED: Chronic | Status: ACTIVE | Noted: 2018-07-23

## 2022-07-13 PROBLEM — S82.899A OTHER FRACTURE OF UNSPECIFIED LOWER LEG, INITIAL ENCOUNTER FOR CLOSED FRACTURE: Chronic | Status: ACTIVE | Noted: 2018-07-19

## 2022-07-13 PROBLEM — M17.10 UNILATERAL PRIMARY OSTEOARTHRITIS, UNSPECIFIED KNEE: Chronic | Status: ACTIVE | Noted: 2018-07-19

## 2022-07-13 LAB
ADD ON TEST-SPECIMEN IN LAB: SIGNIFICANT CHANGE UP
ADD ON TEST-SPECIMEN IN LAB: SIGNIFICANT CHANGE UP
ALBUMIN SERPL ELPH-MCNC: 3.1 G/DL — LOW (ref 3.3–5)
ALP SERPL-CCNC: 81 U/L — SIGNIFICANT CHANGE UP (ref 40–120)
ALT FLD-CCNC: 94 U/L — HIGH (ref 12–78)
ANION GAP SERPL CALC-SCNC: 5 MMOL/L — SIGNIFICANT CHANGE UP (ref 5–17)
ANION GAP SERPL CALC-SCNC: 6 MMOL/L — SIGNIFICANT CHANGE UP (ref 5–17)
APTT BLD: 37.5 SEC — HIGH (ref 27.5–35.5)
APTT BLD: 47.7 SEC — HIGH (ref 27.5–35.5)
AST SERPL-CCNC: 118 U/L — HIGH (ref 15–37)
BASOPHILS # BLD AUTO: 0.04 K/UL — SIGNIFICANT CHANGE UP (ref 0–0.2)
BASOPHILS NFR BLD AUTO: 0.3 % — SIGNIFICANT CHANGE UP (ref 0–2)
BILIRUB SERPL-MCNC: 2.6 MG/DL — HIGH (ref 0.2–1.2)
BUN SERPL-MCNC: 19 MG/DL — SIGNIFICANT CHANGE UP (ref 7–23)
BUN SERPL-MCNC: 21 MG/DL — SIGNIFICANT CHANGE UP (ref 7–23)
CALCIUM SERPL-MCNC: 8.3 MG/DL — LOW (ref 8.5–10.1)
CALCIUM SERPL-MCNC: 8.6 MG/DL — SIGNIFICANT CHANGE UP (ref 8.5–10.1)
CHLORIDE SERPL-SCNC: 101 MMOL/L — SIGNIFICANT CHANGE UP (ref 96–108)
CHLORIDE SERPL-SCNC: 104 MMOL/L — SIGNIFICANT CHANGE UP (ref 96–108)
CHOLEST SERPL-MCNC: 134 MG/DL — SIGNIFICANT CHANGE UP
CK SERPL-CCNC: 516 U/L — HIGH (ref 26–308)
CO2 SERPL-SCNC: 28 MMOL/L — SIGNIFICANT CHANGE UP (ref 22–31)
CO2 SERPL-SCNC: 30 MMOL/L — SIGNIFICANT CHANGE UP (ref 22–31)
CREAT SERPL-MCNC: 0.93 MG/DL — SIGNIFICANT CHANGE UP (ref 0.5–1.3)
CREAT SERPL-MCNC: 1.09 MG/DL — SIGNIFICANT CHANGE UP (ref 0.5–1.3)
EGFR: 74 ML/MIN/1.73M2 — SIGNIFICANT CHANGE UP
EGFR: 89 ML/MIN/1.73M2 — SIGNIFICANT CHANGE UP
EOSINOPHIL # BLD AUTO: 0.01 K/UL — SIGNIFICANT CHANGE UP (ref 0–0.5)
EOSINOPHIL NFR BLD AUTO: 0.1 % — SIGNIFICANT CHANGE UP (ref 0–6)
GLUCOSE SERPL-MCNC: 116 MG/DL — HIGH (ref 70–99)
GLUCOSE SERPL-MCNC: 152 MG/DL — HIGH (ref 70–99)
HCT VFR BLD CALC: 39 % — SIGNIFICANT CHANGE UP (ref 39–50)
HCT VFR BLD CALC: 39.7 % — SIGNIFICANT CHANGE UP (ref 39–50)
HCT VFR BLD CALC: 40.2 % — SIGNIFICANT CHANGE UP (ref 39–50)
HDLC SERPL-MCNC: 64 MG/DL — SIGNIFICANT CHANGE UP
HGB BLD-MCNC: 13.2 G/DL — SIGNIFICANT CHANGE UP (ref 13–17)
HGB BLD-MCNC: 13.3 G/DL — SIGNIFICANT CHANGE UP (ref 13–17)
HGB BLD-MCNC: 13.4 G/DL — SIGNIFICANT CHANGE UP (ref 13–17)
IMM GRANULOCYTES NFR BLD AUTO: 0.8 % — SIGNIFICANT CHANGE UP (ref 0–1.5)
INR BLD: 1.31 RATIO — HIGH (ref 0.88–1.16)
LIPID PNL WITH DIRECT LDL SERPL: 56 MG/DL — SIGNIFICANT CHANGE UP
LYMPHOCYTES # BLD AUTO: 0.79 K/UL — LOW (ref 1–3.3)
LYMPHOCYTES # BLD AUTO: 5.8 % — LOW (ref 13–44)
MCHC RBC-ENTMCNC: 29.2 PG — SIGNIFICANT CHANGE UP (ref 27–34)
MCHC RBC-ENTMCNC: 29.4 PG — SIGNIFICANT CHANGE UP (ref 27–34)
MCHC RBC-ENTMCNC: 29.7 PG — SIGNIFICANT CHANGE UP (ref 27–34)
MCHC RBC-ENTMCNC: 33.3 GM/DL — SIGNIFICANT CHANGE UP (ref 32–36)
MCHC RBC-ENTMCNC: 33.5 GM/DL — SIGNIFICANT CHANGE UP (ref 32–36)
MCHC RBC-ENTMCNC: 33.8 GM/DL — SIGNIFICANT CHANGE UP (ref 32–36)
MCV RBC AUTO: 87.3 FL — SIGNIFICANT CHANGE UP (ref 80–100)
MCV RBC AUTO: 87.8 FL — SIGNIFICANT CHANGE UP (ref 80–100)
MCV RBC AUTO: 88.2 FL — SIGNIFICANT CHANGE UP (ref 80–100)
MONOCYTES # BLD AUTO: 1.24 K/UL — HIGH (ref 0–0.9)
MONOCYTES NFR BLD AUTO: 9.1 % — SIGNIFICANT CHANGE UP (ref 2–14)
NEUTROPHILS # BLD AUTO: 11.43 K/UL — HIGH (ref 1.8–7.4)
NEUTROPHILS NFR BLD AUTO: 83.9 % — HIGH (ref 43–77)
NON HDL CHOLESTEROL: 69 MG/DL — SIGNIFICANT CHANGE UP
PLATELET # BLD AUTO: 217 K/UL — SIGNIFICANT CHANGE UP (ref 150–400)
PLATELET # BLD AUTO: 225 K/UL — SIGNIFICANT CHANGE UP (ref 150–400)
PLATELET # BLD AUTO: 233 K/UL — SIGNIFICANT CHANGE UP (ref 150–400)
POTASSIUM SERPL-MCNC: 2.7 MMOL/L — CRITICAL LOW (ref 3.5–5.3)
POTASSIUM SERPL-MCNC: 2.8 MMOL/L — CRITICAL LOW (ref 3.5–5.3)
POTASSIUM SERPL-MCNC: 3.1 MMOL/L — LOW (ref 3.5–5.3)
POTASSIUM SERPL-SCNC: 2.7 MMOL/L — CRITICAL LOW (ref 3.5–5.3)
POTASSIUM SERPL-SCNC: 2.8 MMOL/L — CRITICAL LOW (ref 3.5–5.3)
POTASSIUM SERPL-SCNC: 3.1 MMOL/L — LOW (ref 3.5–5.3)
PROT SERPL-MCNC: 6.5 GM/DL — SIGNIFICANT CHANGE UP (ref 6–8.3)
PROTHROM AB SERPL-ACNC: 15.2 SEC — HIGH (ref 10.5–13.4)
RBC # BLD: 4.44 M/UL — SIGNIFICANT CHANGE UP (ref 4.2–5.8)
RBC # BLD: 4.55 M/UL — SIGNIFICANT CHANGE UP (ref 4.2–5.8)
RBC # BLD: 4.56 M/UL — SIGNIFICANT CHANGE UP (ref 4.2–5.8)
RBC # FLD: 13.2 % — SIGNIFICANT CHANGE UP (ref 10.3–14.5)
SODIUM SERPL-SCNC: 136 MMOL/L — SIGNIFICANT CHANGE UP (ref 135–145)
SODIUM SERPL-SCNC: 138 MMOL/L — SIGNIFICANT CHANGE UP (ref 135–145)
TRIGL SERPL-MCNC: 68 MG/DL — SIGNIFICANT CHANGE UP
TROPONIN I, HIGH SENSITIVITY RESULT: HIGH NG/L
TROPONIN I, HIGH SENSITIVITY RESULT: HIGH NG/L
WBC # BLD: 12.92 K/UL — HIGH (ref 3.8–10.5)
WBC # BLD: 13.62 K/UL — HIGH (ref 3.8–10.5)
WBC # BLD: 14.12 K/UL — HIGH (ref 3.8–10.5)
WBC # FLD AUTO: 12.92 K/UL — HIGH (ref 3.8–10.5)
WBC # FLD AUTO: 13.62 K/UL — HIGH (ref 3.8–10.5)
WBC # FLD AUTO: 14.12 K/UL — HIGH (ref 3.8–10.5)

## 2022-07-13 PROCEDURE — 93306 TTE W/DOPPLER COMPLETE: CPT | Mod: 26

## 2022-07-13 PROCEDURE — 12345: CPT | Mod: NC

## 2022-07-13 PROCEDURE — 99223 1ST HOSP IP/OBS HIGH 75: CPT

## 2022-07-13 RX ORDER — CLOPIDOGREL BISULFATE 75 MG/1
75 TABLET, FILM COATED ORAL DAILY
Refills: 0 | Status: DISCONTINUED | OUTPATIENT
Start: 2022-07-14 | End: 2022-07-14

## 2022-07-13 RX ORDER — NITROGLYCERIN 6.5 MG
0.4 CAPSULE, EXTENDED RELEASE ORAL ONCE
Refills: 0 | Status: DISCONTINUED | OUTPATIENT
Start: 2022-07-13 | End: 2022-07-14

## 2022-07-13 RX ORDER — HEPARIN SODIUM 5000 [USP'U]/ML
1500 INJECTION INTRAVENOUS; SUBCUTANEOUS
Qty: 25000 | Refills: 0 | Status: DISCONTINUED | OUTPATIENT
Start: 2022-07-13 | End: 2022-07-14

## 2022-07-13 RX ORDER — ATORVASTATIN CALCIUM 80 MG/1
80 TABLET, FILM COATED ORAL AT BEDTIME
Refills: 0 | Status: DISCONTINUED | OUTPATIENT
Start: 2022-07-13 | End: 2022-07-14

## 2022-07-13 RX ORDER — CYCLOBENZAPRINE HYDROCHLORIDE 10 MG/1
5 TABLET, FILM COATED ORAL AT BEDTIME
Refills: 0 | Status: DISCONTINUED | OUTPATIENT
Start: 2022-07-13 | End: 2022-07-14

## 2022-07-13 RX ORDER — POTASSIUM CHLORIDE 20 MEQ
10 PACKET (EA) ORAL
Refills: 0 | Status: COMPLETED | OUTPATIENT
Start: 2022-07-13 | End: 2022-07-13

## 2022-07-13 RX ORDER — SIMVASTATIN 20 MG/1
40 TABLET, FILM COATED ORAL AT BEDTIME
Refills: 0 | Status: DISCONTINUED | OUTPATIENT
Start: 2022-07-13 | End: 2022-07-13

## 2022-07-13 RX ORDER — ASPIRIN/CALCIUM CARB/MAGNESIUM 324 MG
81 TABLET ORAL DAILY
Refills: 0 | Status: DISCONTINUED | OUTPATIENT
Start: 2022-07-14 | End: 2022-07-14

## 2022-07-13 RX ORDER — ONDANSETRON 8 MG/1
4 TABLET, FILM COATED ORAL EVERY 8 HOURS
Refills: 0 | Status: DISCONTINUED | OUTPATIENT
Start: 2022-07-13 | End: 2022-07-14

## 2022-07-13 RX ORDER — LIDOCAINE 4 G/100G
1 CREAM TOPICAL DAILY
Refills: 0 | Status: DISCONTINUED | OUTPATIENT
Start: 2022-07-13 | End: 2022-07-14

## 2022-07-13 RX ORDER — ACETAMINOPHEN 500 MG
650 TABLET ORAL EVERY 6 HOURS
Refills: 0 | Status: DISCONTINUED | OUTPATIENT
Start: 2022-07-13 | End: 2022-07-14

## 2022-07-13 RX ORDER — SODIUM CHLORIDE 9 MG/ML
250 INJECTION INTRAMUSCULAR; INTRAVENOUS; SUBCUTANEOUS ONCE
Refills: 0 | Status: COMPLETED | OUTPATIENT
Start: 2022-07-14 | End: 2022-07-14

## 2022-07-13 RX ORDER — LOSARTAN POTASSIUM 100 MG/1
100 TABLET, FILM COATED ORAL DAILY
Refills: 0 | Status: DISCONTINUED | OUTPATIENT
Start: 2022-07-13 | End: 2022-07-14

## 2022-07-13 RX ORDER — TRAMADOL HYDROCHLORIDE 50 MG/1
25 TABLET ORAL EVERY 6 HOURS
Refills: 0 | Status: DISCONTINUED | OUTPATIENT
Start: 2022-07-13 | End: 2022-07-14

## 2022-07-13 RX ORDER — POTASSIUM CHLORIDE 20 MEQ
40 PACKET (EA) ORAL EVERY 4 HOURS
Refills: 0 | Status: COMPLETED | OUTPATIENT
Start: 2022-07-13 | End: 2022-07-13

## 2022-07-13 RX ORDER — POLYETHYLENE GLYCOL 3350 17 G/17G
17 POWDER, FOR SOLUTION ORAL DAILY
Refills: 0 | Status: DISCONTINUED | OUTPATIENT
Start: 2022-07-13 | End: 2022-07-14

## 2022-07-13 RX ORDER — LANOLIN ALCOHOL/MO/W.PET/CERES
3 CREAM (GRAM) TOPICAL AT BEDTIME
Refills: 0 | Status: DISCONTINUED | OUTPATIENT
Start: 2022-07-13 | End: 2022-07-14

## 2022-07-13 RX ADMIN — TRAMADOL HYDROCHLORIDE 25 MILLIGRAM(S): 50 TABLET ORAL at 01:00

## 2022-07-13 RX ADMIN — Medication 100 MILLIEQUIVALENT(S): at 11:07

## 2022-07-13 RX ADMIN — HEPARIN SODIUM 5300 UNIT(S): 5000 INJECTION INTRAVENOUS; SUBCUTANEOUS at 13:22

## 2022-07-13 RX ADMIN — Medication 40 MILLIEQUIVALENT(S): at 14:43

## 2022-07-13 RX ADMIN — HEPARIN SODIUM 1500 UNIT(S)/HR: 5000 INJECTION INTRAVENOUS; SUBCUTANEOUS at 20:08

## 2022-07-13 RX ADMIN — HEPARIN SODIUM 1250 UNIT(S)/HR: 5000 INJECTION INTRAVENOUS; SUBCUTANEOUS at 05:42

## 2022-07-13 RX ADMIN — LIDOCAINE 1 PATCH: 4 CREAM TOPICAL at 22:39

## 2022-07-13 RX ADMIN — HEPARIN SODIUM 1500 UNIT(S)/HR: 5000 INJECTION INTRAVENOUS; SUBCUTANEOUS at 19:18

## 2022-07-13 RX ADMIN — HEPARIN SODIUM 1700 UNIT(S)/HR: 5000 INJECTION INTRAVENOUS; SUBCUTANEOUS at 20:30

## 2022-07-13 RX ADMIN — Medication 100 MILLIEQUIVALENT(S): at 13:07

## 2022-07-13 RX ADMIN — Medication 100 MILLIEQUIVALENT(S): at 00:17

## 2022-07-13 RX ADMIN — Medication 40 MILLIEQUIVALENT(S): at 11:07

## 2022-07-13 RX ADMIN — Medication 100 MILLIEQUIVALENT(S): at 01:22

## 2022-07-13 RX ADMIN — LOSARTAN POTASSIUM 100 MILLIGRAM(S): 100 TABLET, FILM COATED ORAL at 09:21

## 2022-07-13 RX ADMIN — HEPARIN SODIUM 5300 UNIT(S): 5000 INJECTION INTRAVENOUS; SUBCUTANEOUS at 06:16

## 2022-07-13 RX ADMIN — Medication 100 MILLIEQUIVALENT(S): at 12:08

## 2022-07-13 RX ADMIN — Medication 40 MILLIEQUIVALENT(S): at 01:22

## 2022-07-13 RX ADMIN — HEPARIN SODIUM 1250 UNIT(S)/HR: 5000 INJECTION INTRAVENOUS; SUBCUTANEOUS at 07:05

## 2022-07-13 RX ADMIN — ATORVASTATIN CALCIUM 80 MILLIGRAM(S): 80 TABLET, FILM COATED ORAL at 21:20

## 2022-07-13 RX ADMIN — LIDOCAINE 1 PATCH: 4 CREAM TOPICAL at 19:42

## 2022-07-13 RX ADMIN — LIDOCAINE 1 PATCH: 4 CREAM TOPICAL at 11:42

## 2022-07-13 RX ADMIN — Medication 650 MILLIGRAM(S): at 18:06

## 2022-07-13 RX ADMIN — Medication 40 MILLIEQUIVALENT(S): at 06:17

## 2022-07-13 RX ADMIN — HEPARIN SODIUM 1500 UNIT(S)/HR: 5000 INJECTION INTRAVENOUS; SUBCUTANEOUS at 13:48

## 2022-07-13 RX ADMIN — TRAMADOL HYDROCHLORIDE 25 MILLIGRAM(S): 50 TABLET ORAL at 00:38

## 2022-07-13 NOTE — H&P ADULT - NSHPSOCIALHISTORY_GEN_ALL_CORE
Lives with his wife. Works in retail. Independent with ADLs and IADLs. Smoked 1/4 ppd for 5 years, quit 10 years ago. Denies alcohol and drug use.

## 2022-07-13 NOTE — H&P ADULT - NSHPPHYSICALEXAM_GEN_ALL_CORE
ICU Vital Signs Last 24 Hrs  T(C): 37 (12 Jul 2022 23:51), Max: 37 (12 Jul 2022 23:51)  T(F): 98.6 (12 Jul 2022 23:51), Max: 98.6 (12 Jul 2022 23:51)  HR: 70 (12 Jul 2022 23:51) (67 - 70)  BP: 117/98 (12 Jul 2022 23:51) (116/76 - 137/76)  BP(mean): 85 (12 Jul 2022 23:15) (85 - 95)  RR: 17 (12 Jul 2022 23:51) (16 - 18)  SpO2: 96% (12 Jul 2022 23:51) (95% - 100%)    O2 Parameters below as of 12 Jul 2022 23:51  Patient On (Oxygen Delivery Method): room air    General: Awake and alert, cooperative with exam. No acute distress.   Skin: Warm, dry, and pink.   Eyes: Pupils equal and reactive to light. Extraocular eye movements intact. No conjunctival injection, discharge, or scleral icterus.   HEENT: Atraumatic, normocephalic. Moist mucus membranes.   Cardiology: Normal S1, S2. No murmurs, rubs, or gallops. Regular rate and rhythm. No tenderness on palpation of the chest.   Respiratory: Lungs clear to ascultation bilaterally. Good air exchange. No wheezes, rales, or rhonchi. Normal chest expansion.   Gastrointestinal: Positive bowel sounds. Soft, non-tender, non-distended. No guarding, rigidity, or rebound tenderness. No hepatosplenomegaly.   Musculoskeletal: 5/5 motor strength in all extremities. Normal range of motion.   Extremities: No peripheral edema bilaterally. Dorsalis pedis pulses 2+ bilaterally.   Neurological: A+Ox3 (person, place, and time). Cranial nerves 2-12 intact. Normal speech. No facial droop. No focal neurological deficits.   Psychiatric: Normal affect. Normal mood.

## 2022-07-13 NOTE — CHART NOTE - NSCHARTNOTEFT_GEN_A_CORE
Cardiac Cath Lab Nurse Practitioner :  HPI: 67y Male with a past medical history of hypertension hyperlipidemia admitted for non-STEMI.  Cardiac enzymes trending down.  Symptoms started on Sunday, likely initial event.      -plan for cardiac cath for ischemic work up 7/14 AM  - NPO after midnight x meds in AM   - IVF 250cc bolus at 8am 7/14  - Cath NP team to follow in AM Cardiac Cath Lab Nurse Practitioner :  HPI: 67y Male with a past medical history of hypertension hyperlipidemia admitted for non-STEMI.  Cardiac enzymes trending down.  Symptoms started on Sunday, likely initial event.    -plan for cardiac cath for ischemic work up 7/14 AM  - NPO after midnight x meds in AM   - IVF 250cc bolus at 8am 7/14  - noted K 2.8 today and repleted; repeat now and in AM  - Cath NP team to follow in AM

## 2022-07-13 NOTE — CONSULT NOTE ADULT - SUBJECTIVE AND OBJECTIVE BOX
Patient is a 67y old  Male who presents with a chief complaint of Abnormal EKG (13 Jul 2022 00:23)    ________________________________  GRIS VERAS is a 67y year old Male with a past medical history of hypertension, hyperlipidemia, history of hypokalemia potassium supplementation who is also on a diuretic for hypertension, history of OA, undescended left testicle s/p surgery presented with abnormal EKG.    Patient stated on Sunday he woke up from a nap with profuse diaphoresis.  He also has been having shortness of breath, was seen by Dr Mendosa  earlier this year and advised to have a stress test for shortness of breath. he did not follow-up for stress test.  Given his shortness of breath, he went to an urgent care and was sent to the ER for further evaluation for possible inferior wall MI.    In the ER, EKG was essentially unchanged.  Cardiac enzymes were elevated.  K was low.  ________________________________  Review of systems: A 10 point review of system has been performed, and is negative except for what has been mentioned in the above history of present illness.     PAST MEDICAL & SURGICAL HISTORY:  HTN (hypertension)      HLD (hyperlipidemia)      OA (osteoarthritis) of knee  right knee      Fracture of ankle  fracture lateral malleolus, closed right      Undescended testicle  left      History of tonsillectomy      H/O undescended testicle  left, surgery to correct        FAMILY HISTORY:  Family history of lung cancer (Father)  father - lung ca and heart disease    SOCIAL HISTORY: The patient denies any history of tobacco abuse, alcohol abuse or illicit drug use.    ALLERGIES:  Bee sting -  swelling (Hives)  No Known Drug Allergies    Home Medications:  chlorthalidone 25 mg oral tablet: 1 tab(s) orally once a day (13 Jul 2022 00:11)  losartan 100 mg oral tablet: 1 tab(s) orally once a day (at bedtime) (13 Jul 2022 00:11)  Motrin  mg oral tablet: 2 tab(s) orally once a day (13 Jul 2022 00:11)  potassium chloride 10 mEq oral tablet, extended release: 1 tab(s) orally once a day (13 Jul 2022 00:11)  simvastatin 40 mg oral tablet: 1 tab(s) orally once a day (at bedtime) (13 Jul 2022 00:11)    MEDICATIONS  (STANDING):  heparin  Infusion. 1500 Unit(s)/Hr (15 mL/Hr) IV Continuous <Continuous>  lidocaine   4% Patch 1 Patch Transdermal daily  losartan 100 milliGRAM(s) Oral daily  simvastatin 40 milliGRAM(s) Oral at bedtime    MEDICATIONS  (PRN):  acetaminophen     Tablet .. 650 milliGRAM(s) Oral every 6 hours PRN Temp greater or equal to 38C (100.4F), Mild Pain (1 - 3)  aluminum hydroxide/magnesium hydroxide/simethicone Suspension 30 milliLiter(s) Oral every 4 hours PRN Dyspepsia  cyclobenzaprine 5 milliGRAM(s) Oral at bedtime PRN Spasm  heparin   Injectable 5300 Unit(s) IV Push every 6 hours PRN For aPTT less than 40  melatonin 3 milliGRAM(s) Oral at bedtime PRN Insomnia  nitroglycerin     SubLingual 0.4 milliGRAM(s) SubLingual once PRN Chest Pain  ondansetron Injectable 4 milliGRAM(s) IV Push every 8 hours PRN Nausea and/or Vomiting  polyethylene glycol 3350 17 Gram(s) Oral daily PRN Constipation  traMADol 25 milliGRAM(s) Oral every 6 hours PRN Moderate Pain (4 - 6)    Vital Signs Last 24 Hrs  T(C): 36.9 (13 Jul 2022 07:16), Max: 37 (12 Jul 2022 23:51)  T(F): 98.4 (13 Jul 2022 07:16), Max: 98.6 (12 Jul 2022 23:51)  HR: 73 (13 Jul 2022 07:16) (67 - 73)  BP: 136/76 (13 Jul 2022 07:16) (116/76 - 137/76)  BP(mean): 90 (13 Jul 2022 07:16) (85 - 95)  RR: 18 (13 Jul 2022 07:16) (16 - 18)  SpO2: 96% (12 Jul 2022 23:51) (95% - 100%)    Parameters below as of 13 Jul 2022 07:16  Patient On (Oxygen Delivery Method): room air      I&O's Summary    ________________________________  GENERAL APPEARANCE:  No acute distress  HEAD: normocephalic, atraumatic  NECK: supple, no jugular venous distention, no carotid bruit    HEART: Regular rate and rhythm, S1, S2 normal, 1/6 murmur    CHEST:  No anterior chest wall tenderness    LUNGS:  Clear to auscultation, without any wheezing, rhonchi or rales    ABDOMEN: soft, nontender, nondistended, with positive bowel sounds appreciated  EXTREMITIES: no edema.   NEURO: Alert and oriented x3  PSYC:  Normal affect  SKIN:  Dry  ________________________________   TELEMETRY: Sinus rhythm with PVCs    ECG: Sinus rhythm old inferior wall MI    LABS:                        13.4   13.62 )-----------( 225      ( 13 Jul 2022 09:16 )             40.2             07-13    x   |  x   |  x   ----------------------------<  x   2.8<LL>   |  x   |  x     Ca    8.3<L>      13 Jul 2022 04:53  Mg     2.5     07-13    TPro  6.5  /  Alb  3.1<L>  /  TBili  2.6<H>  /  DBili  x   /  AST  118<H>  /  ALT  94<H>  /  AlkPhos  81  07-13      Lipid Panel  Chl 134  HDL 64  LDL --  Trg 68  LIVER FUNCTIONS - ( 13 Jul 2022 04:53 )  Alb: 3.1 g/dL / Pro: 6.5 gm/dL / ALK PHOS: 81 U/L / ALT: 94 U/L / AST: 118 U/L / GGT: x         PT/INR - ( 13 Jul 2022 09:16 )   PT: 15.2 sec;   INR: 1.31 ratio         PTT - ( 13 Jul 2022 12:32 )  PTT:35.6 sec    07-13 @ 04:53  Trop-I  --  CK      516  CK-MB   --    Pro BNP  2117 07-12 @ 20:15  D Dimer  155 07-12 @ 20:15    PT/INR - ( 13 Jul 2022 09:16 )   PT: 15.2 sec;   INR: 1.31 ratio       PTT - ( 13 Jul 2022 12:32 )  PTT:35.6 sec    CARDIAC MARKERS ( 13 Jul 2022 04:53 )  x     / x     / 516 U/L / x     / x         ________________________________    RADIOLOGY & ADDITIONAL STUDIES:  CXR Clear  ________________________________    ASSESSMENT:  Non-STEMI  Hypertension  Hyperlipidemia    PLAN:  In summary, this is a 67y Male with a past medical history of hypertension hyperlipidemia admitted for non-STEMI.  Cardiac enzymes trending down.  Symptoms started on Sunday, likely initial event.  Patient likely due to myocardial Q waves inferiorly.  Obtain echo.  Continue with full dose anticoagulation.  Plan for coronary angiogram in the morning.  Risk and benefit discussed with patient.  He would like to proceed.  Continue antiplatelet therapy.  Continue statin but switch to Lipitor 80 mg daily.  ____________________________________________  (Dragon Dictation software used). Thank you for allowing me to participate in the care of your patient. Please contact me should any questions arise.    LILIANA Lakhani DO, [facility]  Office: [phone]

## 2022-07-13 NOTE — H&P ADULT - NSHPREVIEWOFSYSTEMS_GEN_ALL_CORE
Constitutional: negative for fatigue, negative for fever, negative for chills, negative for decreased appetite.  Skin: negative for rashes, negative for open wounds, negative for jaundice.   Eyes: negative for blurry vision, negative for double vision.   Ears, nose, throat: negative for ear pain, negative for nasal congestion, negative for sore throat, negative for lymph node swelling.   Cardiovascular: negative for chest pain, negative for palpitations, negative for lower extremity swelling.   Respiratory: negative for shortness of breath, negative for wheezing, negative for cough.   Gastrointestinal: negative for abdominal pain, negative for nausea, negative for vomiting, negative for diarrhea, negative for constipation, negative for blood in the stool, negative for black tarry stools.   Genitourinary: negative for burning on urination, negative for urinary urgency or frequency, negative for blood in the urine.   Endocrine: negative for cold intolerance, negative for heat intolerance, negative for increased thirst.   Hematologic: negative for easy bruising or bleeding.   Musculoskeletal: positive for muscle/joint pain, negative for decreased range of motion.   Neurological: negative for dizziness, negative for headaches, negative for loss of consciousness, negative for motor weakness, negative for sensory deficits.   Psychiatric: negative for depression, negative for anxiety.

## 2022-07-13 NOTE — PROGRESS NOTE ADULT - ASSESSMENT
69 yo man with HTN, HLD, presented for evaluation after episode of diaphoresis and neck discomfort, found to have markedly elevated troponin in ER. EKG with SR, signs of old inf wall MI. Admitted to [facility]    NSTEMI  Now asymptomatic. Trops downtrending. No significant events on tele. CXR clear. TTE done. Left ventricle is normal in size, wall thickness, wall motion and contractility. Estimated left ventricular ejection fraction is 55-60 %. Normal appearing right ventricle structure and function. No evidence of pericardial effusion. Now planned for cardiac cath.  - Cath in AM  - Continue antiplatelet, statin, heparin drip    Hypokalemia  Could be related to patient's home chlorthalidone. Stopped the medication.   - Continue to replete potassium, trend K. Note Mg WNL

## 2022-07-13 NOTE — PROGRESS NOTE ADULT - SUBJECTIVE AND OBJECTIVE BOX
Chief Complaint: Diaphoresis, neck pain    Interval Hx: patient seen and examined. Now asymptomatic. No further episodes of diaphoresis. No neck pain. No chest pain or tightness. No dyspnea, lightheadedness or palpitations. No other complaints. K improved to 3.1 with supplementation. Cardiology is anticipating cardiac cath tomorrow, 7/14.     ROS: Multi system review is comprehensively negative x 10 systems except as above    Vitals:  T(F): 99.7 (13 Jul 2022 19:15), Max: 99.7 (13 Jul 2022 19:15)  HR: 80 (13 Jul 2022 19:15) (67 - 80)  BP: 105/54 (13 Jul 2022 19:15) (105/54 - 136/76)  RR: 18 (13 Jul 2022 19:15) (16 - 18)  SpO2: 96% (13 Jul 2022 19:15) (95% - 96%) on room air    Exam:  GENERAL APPEARANCE:  No acute distress  HEAD: normocephalic, atraumatic  NECK: supple, no jugular venous distention, no carotid bruit  HEART: Regular rate and rhythm, S1, S2 normal, 1/6 murmur  CHEST:  No anterior chest wall tenderness  LUNGS:  Clear to auscultation, without any wheezing, rhonchi or rales  ABDOMEN: soft, nontender, nondistended, with positive bowel sounds appreciated  EXTREMITIES: no edema.   NEURO: Alert and oriented x3  PSYCH:  Normal affect  SKIN: Warm, dry, intact    Labs:               13.2   14.12 )-----------( 233                 39.0       136  |  101  |  21  --------------------<  152  3.1   |  30  |  1.09    Ca 8.6       Mg  2.5         TPro  6.5  /  Alb  3.1  /  TBili  2.6  /  DBili  x   /  AST  118  /  ALT  94  /  AlkPhos  81      PT: 15.2 sec;   INR: 1.31 ratio    PTT:47.7 sec    Troponin 15,757 --> 16,966 --> 13,542    Pro BNP 2117    Micro:  COVID19, flu and RSV PCR negative    Imaging:  CXR 7/12: Clear lungs, borderline enlarged cardiac silhouette    Cardiac Testing:  TTE 7/13:  Mild to Moderate mitral regurgitation is present. Mild aortic sclerosis is present with normal valvular opening. Mild (1+) aortic regurgitation is present. Mild (1+) tricuspid valve regurgitation is present. The left ventricle is normal in size, wall thickness, wall motion and contractility. Estimated left ventricular ejection fraction is 55-60 %. Normal appearing right ventricle structure and function. No evidence of pericardial effusion.    TELEMETRY 7/13: Sinus rhythm with PVCs    ECG 7/12: Sinus rhythm old inferior wall MI    Meds:  MEDICATIONS  (STANDING):  atorvastatin 80 milliGRAM(s) Oral at bedtime  heparin  Infusion. 1500 Unit(s)/Hr (15 mL/Hr) IV Continuous <Continuous>  lidocaine   4% Patch 1 Patch Transdermal daily  losartan 100 milliGRAM(s) Oral daily    MEDICATIONS  (PRN):  acetaminophen     Tablet .. 650 milliGRAM(s) Oral every 6 hours PRN Temp greater or equal to 38C (100.4F), Mild Pain (1 - 3)  aluminum hydroxide/magnesium hydroxide/simethicone Suspension 30 milliLiter(s) Oral every 4 hours PRN Dyspepsia  cyclobenzaprine 5 milliGRAM(s) Oral at bedtime PRN Spasm  heparin   Injectable 5300 Unit(s) IV Push every 6 hours PRN For aPTT less than 40  melatonin 3 milliGRAM(s) Oral at bedtime PRN Insomnia  nitroglycerin     SubLingual 0.4 milliGRAM(s) SubLingual once PRN Chest Pain  ondansetron Injectable 4 milliGRAM(s) IV Push every 8 hours PRN Nausea and/or Vomiting  polyethylene glycol 3350 17 Gram(s) Oral daily PRN Constipation  traMADol 25 milliGRAM(s) Oral every 6 hours PRN Moderate Pain (4 - 6)

## 2022-07-13 NOTE — H&P ADULT - HISTORY OF PRESENT ILLNESS
68 y/o M with PMH HTN, HLD, hypokalemia, fractured ankle, OA, undescended left testicle s/p surgery presented with abnormal EKG. The pt states on Sunday he woke up from a nap and was sweating. He woke up the next day and had neck pain. 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 Yesterday his neck pain worsened and he went to a walk in. He was told he was in A fib and was encouraged to come to the ER for evaluation. The pt states that he never had any chest pain. Reports occasional SOB. Denies fevers, chills, chest pain, SOB, abdominal pain, N/V, diarrhea/constipation.     ER course: VSS. Labs: WBC 16.89, neutrophils 80.2%, INR 1.19, K+ 2.6, CO2 33, Cr 1.36 (baseline ~1.07 in 2018), glucose 123, total bilirubin 2.6, , , Troponin 45007.03, BNP 2117. COVID negative, Influenza A/B negative, RSV negative.     - EKG from urgent care: P wave present, not A fib, LVH (personally reviewed).   - 1st EKG in ER: NSR with HR 67 bpm, normal intervals, LVH, no ST segment changes, no T wave inversions (personally reviewed).   - 2nd EKG in ER: NSR with HR 72 bpm, normal intervals, LVH, no ST segment changes, no T wave inversions (personally reviewed).     Imaging:  - CXR: no consolidation, no effusion, no pneumothorax (personally reviewed).     Pt was given 324 mg of aspirin, 300 mg of Plavix, 1L of NS, Tylenol, 3 K-riders of KCl, started on a heparin drip. He is being admitted to telemetry for further management.

## 2022-07-13 NOTE — H&P ADULT - ASSESSMENT
66 y/o M presents with abnormal EKG     1. Elevated troponin likely secondary to NSTEMI vs. demand ischemia  - Admit to telemetry   - EKG: no ST segment changes or T wave inversions (no evidence of A fib on EKGs, will monitor on telemetry)   - No current active episodes of chest pain   - Troponin 88003.03, will trend 2 more troponins   - s/p 325 mg of aspirin in the ER, c/w 81 mg of aspirin daily   - s/p 300 mg of Plavix loading dose, c/w 75 mg daily   - c/w home medications: statin, ACE inhibitor   - Monitor off beta blocker for now given elevated BNP   - Ordered ECHO, lipid panel   - Zofran and Maalox for nausea, Nitroglycerin PRN for chest pain   - NPO after midnight for possiblel cath in AM   - Heparin drip   - Pain control: Tylenol, Morphine PRN    - Cardiology consult - Dr. Watson     2. Elevated BNP   - BNP 2117, pt is fluid overloaded on exam   - Ordered ECHO   - Ordered procalcitonin to r/o superimposed bacterial PNA   - Strict I+Os, daily weights   - 2L H20 restriction, 2g sodium restriction      - Keep K > 4 and Mg > 2      3. Leukocytosis   - WBC 16.89, trend   - Tylenol PRN for fevers   - If pt spikes a temperature, will pan-culture   - No evidence of PNA on CXR, COVID/influenza/RSV negative   - Ordered UA     4. Hypokalemia   - K+ 2.6, s/p 3 K-riders of 10 meq in the ER   - Will give 40 mg PO KCl x 2   - Magnesium WNL, f/u AM K+     5. Elevated Cr   - Cr 1.36 (baseline ~1.07 in 2018), monitor closely   - Monitor off IVF given elevated BNP   - Avoid nephrotoxic medications   - Strict I+Os, daily weights     6. Hyperglycemia   - Glucose 123, monitor closely     7. Hyperbilirubinemia, elevated transaminases   - Total bilirubin 2.6, , , trend  - If persistent elevation in transaminases, will order a RUQ US    8. History of HTN, HLD, OA, undescended left testicle s/p surgery  - c/w home medications; verified with pt at the bedside   - Neck pain -> flexeril, tramadol, lidocaine, ice -> if persisting will order CT neck    DVT ppx: Heparin drip   Code status: Full code (Pt agrees to chest compressions and intubation if required).

## 2022-07-14 VITALS
RESPIRATION RATE: 18 BRPM | DIASTOLIC BLOOD PRESSURE: 62 MMHG | SYSTOLIC BLOOD PRESSURE: 131 MMHG | TEMPERATURE: 98 F | OXYGEN SATURATION: 94 % | HEART RATE: 89 BPM

## 2022-07-14 LAB
ADD ON TEST-SPECIMEN IN LAB: SIGNIFICANT CHANGE UP
ADD ON TEST-SPECIMEN IN LAB: SIGNIFICANT CHANGE UP
ANION GAP SERPL CALC-SCNC: 5 MMOL/L — SIGNIFICANT CHANGE UP (ref 5–17)
APTT BLD: 60.1 SEC — HIGH (ref 27.5–35.5)
BUN SERPL-MCNC: 23 MG/DL — SIGNIFICANT CHANGE UP (ref 7–23)
CALCIUM SERPL-MCNC: 8.5 MG/DL — SIGNIFICANT CHANGE UP (ref 8.5–10.1)
CHLORIDE SERPL-SCNC: 101 MMOL/L — SIGNIFICANT CHANGE UP (ref 96–108)
CO2 SERPL-SCNC: 30 MMOL/L — SIGNIFICANT CHANGE UP (ref 22–31)
CREAT SERPL-MCNC: 1 MG/DL — SIGNIFICANT CHANGE UP (ref 0.5–1.3)
EGFR: 82 ML/MIN/1.73M2 — SIGNIFICANT CHANGE UP
GLUCOSE SERPL-MCNC: 100 MG/DL — HIGH (ref 70–99)
HCT VFR BLD CALC: 39.2 % — SIGNIFICANT CHANGE UP (ref 39–50)
HGB BLD-MCNC: 13 G/DL — SIGNIFICANT CHANGE UP (ref 13–17)
MCHC RBC-ENTMCNC: 29.3 PG — SIGNIFICANT CHANGE UP (ref 27–34)
MCHC RBC-ENTMCNC: 33.2 GM/DL — SIGNIFICANT CHANGE UP (ref 32–36)
MCV RBC AUTO: 88.3 FL — SIGNIFICANT CHANGE UP (ref 80–100)
PLATELET # BLD AUTO: 231 K/UL — SIGNIFICANT CHANGE UP (ref 150–400)
POTASSIUM SERPL-MCNC: 2.9 MMOL/L — CRITICAL LOW (ref 3.5–5.3)
POTASSIUM SERPL-SCNC: 2.9 MMOL/L — CRITICAL LOW (ref 3.5–5.3)
RBC # BLD: 4.44 M/UL — SIGNIFICANT CHANGE UP (ref 4.2–5.8)
RBC # FLD: 13.2 % — SIGNIFICANT CHANGE UP (ref 10.3–14.5)
SODIUM SERPL-SCNC: 136 MMOL/L — SIGNIFICANT CHANGE UP (ref 135–145)
WBC # BLD: 12.04 K/UL — HIGH (ref 3.8–10.5)
WBC # FLD AUTO: 12.04 K/UL — HIGH (ref 3.8–10.5)

## 2022-07-14 PROCEDURE — 99239 HOSP IP/OBS DSCHRG MGMT >30: CPT

## 2022-07-14 RX ORDER — CHLORTHALIDONE 50 MG
1 TABLET ORAL
Qty: 0 | Refills: 0 | DISCHARGE

## 2022-07-14 RX ORDER — POTASSIUM CHLORIDE 20 MEQ
10 PACKET (EA) ORAL
Refills: 0 | Status: COMPLETED | OUTPATIENT
Start: 2022-07-14 | End: 2022-07-14

## 2022-07-14 RX ORDER — ASPIRIN/CALCIUM CARB/MAGNESIUM 324 MG
1 TABLET ORAL
Qty: 0 | Refills: 0 | DISCHARGE
Start: 2022-07-14

## 2022-07-14 RX ORDER — POTASSIUM CHLORIDE 20 MEQ
1 PACKET (EA) ORAL
Qty: 0 | Refills: 0 | DISCHARGE

## 2022-07-14 RX ORDER — TRAMADOL HYDROCHLORIDE 50 MG/1
0.5 TABLET ORAL
Qty: 0 | Refills: 0 | DISCHARGE
Start: 2022-07-14

## 2022-07-14 RX ORDER — LOSARTAN POTASSIUM 100 MG/1
1 TABLET, FILM COATED ORAL
Qty: 0 | Refills: 0 | DISCHARGE

## 2022-07-14 RX ORDER — SIMVASTATIN 20 MG/1
1 TABLET, FILM COATED ORAL
Qty: 0 | Refills: 0 | DISCHARGE

## 2022-07-14 RX ORDER — SODIUM CHLORIDE 9 MG/ML
1000 INJECTION INTRAMUSCULAR; INTRAVENOUS; SUBCUTANEOUS
Refills: 0 | Status: DISCONTINUED | OUTPATIENT
Start: 2022-07-14 | End: 2022-07-14

## 2022-07-14 RX ORDER — HEPARIN SODIUM 5000 [USP'U]/ML
15 INJECTION INTRAVENOUS; SUBCUTANEOUS
Qty: 0 | Refills: 0 | DISCHARGE
Start: 2022-07-14

## 2022-07-14 RX ORDER — LOSARTAN POTASSIUM 100 MG/1
1 TABLET, FILM COATED ORAL
Qty: 0 | Refills: 0 | DISCHARGE
Start: 2022-07-14

## 2022-07-14 RX ORDER — ACETAMINOPHEN 500 MG
2 TABLET ORAL
Qty: 0 | Refills: 0 | DISCHARGE
Start: 2022-07-14

## 2022-07-14 RX ORDER — POTASSIUM CHLORIDE 20 MEQ
40 PACKET (EA) ORAL EVERY 4 HOURS
Refills: 0 | Status: DISCONTINUED | OUTPATIENT
Start: 2022-07-14 | End: 2022-07-14

## 2022-07-14 RX ORDER — POTASSIUM PHOSPHATE, MONOBASIC POTASSIUM PHOSPHATE, DIBASIC 236; 224 MG/ML; MG/ML
30 INJECTION, SOLUTION INTRAVENOUS ONCE
Refills: 0 | Status: COMPLETED | OUTPATIENT
Start: 2022-07-14 | End: 2022-07-14

## 2022-07-14 RX ORDER — ATORVASTATIN CALCIUM 80 MG/1
1 TABLET, FILM COATED ORAL
Qty: 0 | Refills: 0 | DISCHARGE
Start: 2022-07-14

## 2022-07-14 RX ORDER — POLYETHYLENE GLYCOL 3350 17 G/17G
17 POWDER, FOR SOLUTION ORAL
Qty: 0 | Refills: 0 | DISCHARGE
Start: 2022-07-14

## 2022-07-14 RX ORDER — CYCLOBENZAPRINE HYDROCHLORIDE 10 MG/1
1 TABLET, FILM COATED ORAL
Qty: 0 | Refills: 0 | DISCHARGE
Start: 2022-07-14

## 2022-07-14 RX ORDER — IBUPROFEN 200 MG
2 TABLET ORAL
Qty: 0 | Refills: 0 | DISCHARGE

## 2022-07-14 RX ORDER — CHLORHEXIDINE GLUCONATE 213 G/1000ML
1 SOLUTION TOPICAL
Refills: 0 | Status: DISCONTINUED | OUTPATIENT
Start: 2022-07-14 | End: 2022-07-14

## 2022-07-14 RX ADMIN — POTASSIUM PHOSPHATE, MONOBASIC POTASSIUM PHOSPHATE, DIBASIC 83.33 MILLIMOLE(S): 236; 224 INJECTION, SOLUTION INTRAVENOUS at 12:57

## 2022-07-14 RX ADMIN — SODIUM CHLORIDE 250 MILLILITER(S): 9 INJECTION INTRAMUSCULAR; INTRAVENOUS; SUBCUTANEOUS at 09:06

## 2022-07-14 RX ADMIN — Medication 40 MILLIEQUIVALENT(S): at 08:39

## 2022-07-14 RX ADMIN — SODIUM CHLORIDE 180 MILLILITER(S): 9 INJECTION INTRAMUSCULAR; INTRAVENOUS; SUBCUTANEOUS at 11:21

## 2022-07-14 RX ADMIN — HEPARIN SODIUM 1700 UNIT(S)/HR: 5000 INJECTION INTRAVENOUS; SUBCUTANEOUS at 07:07

## 2022-07-14 RX ADMIN — Medication 100 MILLIEQUIVALENT(S): at 11:21

## 2022-07-14 RX ADMIN — Medication 81 MILLIGRAM(S): at 12:56

## 2022-07-14 RX ADMIN — Medication 100 MILLIEQUIVALENT(S): at 12:20

## 2022-07-14 RX ADMIN — Medication 40 MILLIEQUIVALENT(S): at 12:03

## 2022-07-14 RX ADMIN — HEPARIN SODIUM 1700 UNIT(S)/HR: 5000 INJECTION INTRAVENOUS; SUBCUTANEOUS at 02:54

## 2022-07-14 RX ADMIN — Medication 100 MILLIEQUIVALENT(S): at 10:22

## 2022-07-14 RX ADMIN — LOSARTAN POTASSIUM 100 MILLIGRAM(S): 100 TABLET, FILM COATED ORAL at 12:56

## 2022-07-14 NOTE — PROGRESS NOTE ADULT - SUBJECTIVE AND OBJECTIVE BOX
Chief Complaint: Diaphoresis, neck pain    Interval Hx: Patient seen and examined. Now asymptomatic. No further episodes of diaphoresis. No neck pain. No chest pain or tightness. No dyspnea, lightheadedness or palpitations. No other complaints. Patient still hypokalemic, presumably due to his recent chlorthalidone. Also hypophosphatemic today. Cardiology performed LHC this afternoon and patient was found to have 3-vessel disease, recommended transfer to tertiary care center for CABG evaluation. Patient to go to [facility]. Awaiting transfer.     ROS: Multi system review is comprehensively negative x 10 systems except as above    Vitals:  T(F): 97.7 (14 Jul 2022 12:57), Max: 99.7 (13 Jul 2022 19:15)  HR: 77 (14 Jul 2022 12:57) (63 - 80)  BP: 123/84 (14 Jul 2022 12:57) (105/54 - 149/92)  RR: 18 (14 Jul 2022 12:57) (16 - 18)  SpO2: 97% (14 Jul 2022 12:57) (96% - 98%) on room air    Exam:  GENERAL APPEARANCE:  No acute distress  HEAD: Normocephalic, atraumatic  NECK: Supple, no jugular venous distention, no carotid bruit  HEART: Regular rate and rhythm, S1, S2 normal, 1/6 murmur  CHEST: No anterior chest wall tenderness. Lungs clear to auscultation, without any wheezing, rhonchi or rales  ABDOMEN: Soft, nontender, nondistended, with positive bowel sounds appreciated  EXTREMITIES: No edema. No tenderness.   SKIN: Warm, dry, intact  NEURO: Alert and oriented x3. No gross deficits.   PSYCH:  Normal affect    Labs:                       13.0   12.04 )---------( 231             39.2       136  |  101  |  23  ---------------------< 100  2.9   |   30   |  1.00    Ca 8.5  Phos 1.4   Mg 2.8    TPro  6.5  /  Alb  3.1<L>  /  TBili  2.6<H>  /  DBili  x   /  AST  118<H>  /  ALT  94<H>  /  AlkPhos  81  07-13    CARDIAC MARKERS ( 13 Jul 2022 04:53 )  x     / x     / 516 U/L / x     / x          LIVER FUNCTIONS - ( 13 Jul 2022 04:53 )  Alb: 3.1 g/dL / Pro: 6.5 gm/dL / ALK PHOS: 81 U/L / ALT: 94 U/L / AST: 118 U/L / GGT: x           PT/INR - ( 13 Jul 2022 09:16 )   PT: 15.2 sec;   INR: 1.31 ratio         PTT - ( 14 Jul 2022 02:15 )  PTT:60.1 sec                                  TPro  6.5  /  Alb  3.1  /  TBili  2.6  /  DBili  x   /  AST  118  /  ALT  94  /  AlkPhos  81      PT: 15.2 sec;   INR: 1.31 ratio    PTT:47.7 sec    Troponin 15,757 --> 16,966 --> 13,542    Pro BNP 2117    Micro:  COVID19, flu and RSV PCR negative    Imaging:  CXR 7/12: Clear lungs, borderline enlarged cardiac silhouette    Cardiac Testing:  TTE 7/13:  Mild to Moderate mitral regurgitation is present. Mild aortic sclerosis is present with normal valvular opening. Mild (1+) aortic regurgitation is present. Mild (1+) tricuspid valve regurgitation is present. The left ventricle is normal in size, wall thickness, wall motion and contractility. Estimated left ventricular ejection fraction is 55-60 %. Normal appearing right ventricle structure and function. No evidence of pericardial effusion.    TELEMETRY 7/13: Sinus rhythm with PVCs    ECG 7/12: Sinus rhythm old inferior wall MI    Meds:  MEDICATIONS  (STANDING):  atorvastatin 80 milliGRAM(s) Oral at bedtime  heparin  Infusion. 1500 Unit(s)/Hr (15 mL/Hr) IV Continuous <Continuous>  lidocaine   4% Patch 1 Patch Transdermal daily  losartan 100 milliGRAM(s) Oral daily    MEDICATIONS  (PRN):  acetaminophen     Tablet .. 650 milliGRAM(s) Oral every 6 hours PRN Temp greater or equal to 38C (100.4F), Mild Pain (1 - 3)  aluminum hydroxide/magnesium hydroxide/simethicone Suspension 30 milliLiter(s) Oral every 4 hours PRN Dyspepsia  cyclobenzaprine 5 milliGRAM(s) Oral at bedtime PRN Spasm  heparin   Injectable 5300 Unit(s) IV Push every 6 hours PRN For aPTT less than 40  melatonin 3 milliGRAM(s) Oral at bedtime PRN Insomnia  nitroglycerin     SubLingual 0.4 milliGRAM(s) SubLingual once PRN Chest Pain  ondansetron Injectable 4 milliGRAM(s) IV Push every 8 hours PRN Nausea and/or Vomiting  polyethylene glycol 3350 17 Gram(s) Oral daily PRN Constipation  traMADol 25 milliGRAM(s) Oral every 6 hours PRN Moderate Pain (4 - 6)         Chief Complaint: Diaphoresis, neck pain    Interval Hx: Patient seen and examined. Now asymptomatic. No further episodes of diaphoresis. No neck pain. No chest pain or tightness. No dyspnea, lightheadedness or palpitations. No other complaints. Patient still hypokalemic, presumably due to his recent chlorthalidone. Also hypophosphatemic today. Cardiology performed LHC this afternoon and patient was found to have 3-vessel disease, recommended transfer to tertiary care center for CABG evaluation. Patient to go to [facility]. Awaiting transfer.     ROS: Multi system review is comprehensively negative x 10 systems except as above    Vitals:  T(F): 97.7 (14 Jul 2022 12:57), Max: 99.7 (13 Jul 2022 19:15)  HR: 77 (14 Jul 2022 12:57) (63 - 80)  BP: 123/84 (14 Jul 2022 12:57) (105/54 - 149/92)  RR: 18 (14 Jul 2022 12:57) (16 - 18)  SpO2: 97% (14 Jul 2022 12:57) (96% - 98%) on room air    Exam:  GENERAL APPEARANCE:  No acute distress  HEAD: Normocephalic, atraumatic  NECK: Supple, no jugular venous distention, no carotid bruit  HEART: Regular rate and rhythm, S1, S2 normal, 1/6 murmur  CHEST: No anterior chest wall tenderness. Lungs clear to auscultation, without any wheezing, rhonchi or rales  ABDOMEN: Soft, nontender, nondistended, with positive bowel sounds appreciated  EXTREMITIES: No edema. No tenderness.   SKIN: Warm, dry, intact  NEURO: Alert and oriented x3. No gross deficits.   PSYCH:  Normal affect    Labs:                       13.0   12.04 )---------( 231             39.2       136  |  101  |  23  ---------------------< 100  2.9   |   30   |  1.00    Ca 8.5  Phos 1.4   Mg 2.8    TPro  6.5  /  Alb  3.1  /  TBili  2.6  /  DBili  x   /  AST  118  /  ALT  94  /  AlkPhos  81     PT: 15.2 sec;   INR: 1.31 ratio  PTT: 60.1 sec    Troponin 15,757 --> 16,966 --> 13,542     proBNP 2117    Micro:  COVID19, flu and RSV PCR negative    Imaging:  CXR 7/12: Clear lungs, borderline enlarged cardiac silhouette    Cardiac Testing:  [facility] 7/14: 3-vessel CAD. Full report pending.    TTE 7/13:  Mild to moderate mitral regurgitation. Mild aortic sclerosis with normal valvular opening. Mild aortic regurgitation. Mild tricuspid valve regurgitation. Left ventricle normal in size, wall thickness, wall motion and contractility. Estimated left ventricular ejection fraction is 55-60 %. Normal appearing right ventricle structure and function. No evidence of pericardial effusion.    Tele 7/13: Sinus rhythm with PVCs    ECG 7/12: Sinus rhythm old inferior wall MI    Meds:  MEDICATIONS  (STANDING):  aspirin  chewable 81 milliGRAM(s) Oral daily  atorvastatin 80 milliGRAM(s) Oral at bedtime  chlorhexidine 4% Liquid 1 Application(s) Topical <User Schedule>  heparin  Infusion. 1500 Unit(s)/Hr (15 mL/Hr) IV Continuous <Continuous>  lidocaine   4% Patch 1 Patch Transdermal daily  losartan 100 milliGRAM(s) Oral daily  potassium chloride    Tablet ER 40 milliEquivalent(s) Oral every 4 hours  sodium chloride 0.9%. 1000 milliLiter(s) (180 mL/Hr) IV Continuous <Continuous>    MEDICATIONS  (PRN):  acetaminophen     Tablet .. 650 milliGRAM(s) Oral every 6 hours PRN Temp greater or equal to 38C (100.4F), Mild Pain (1 - 3)  aluminum hydroxide/magnesium hydroxide/simethicone Suspension 30 milliLiter(s) Oral every 4 hours PRN Dyspepsia  cyclobenzaprine 5 milliGRAM(s) Oral at bedtime PRN Spasm  heparin   Injectable 5300 Unit(s) IV Push every 6 hours PRN For aPTT less than 40  melatonin 3 milliGRAM(s) Oral at bedtime PRN Insomnia  nitroglycerin     SubLingual 0.4 milliGRAM(s) SubLingual once PRN Chest Pain  ondansetron Injectable 4 milliGRAM(s) IV Push every 8 hours PRN Nausea and/or Vomiting  polyethylene glycol 3350 17 Gram(s) Oral daily PRN Constipation  traMADol 25 milliGRAM(s) Oral every 6 hours PRN Moderate Pain (4 - 6)

## 2022-07-14 NOTE — PROGRESS NOTE ADULT - ASSESSMENT
69 yo man with HTN, HLD, presented for evaluation after episode of diaphoresis and neck discomfort, found to have markedly elevated troponin in ER. EKG with SR, signs of old inf wall MI. Admitted to [facility]    NSTEMI  Now asymptomatic. Trops downtrending. No significant events on tele. CXR clear. TTE done. Left ventricle is normal in size, wall thickness, wall motion and contractility. Estimated left ventricular ejection fraction is 55-60 %. Normal appearing right ventricle structure and function. No evidence of pericardial effusion. Now planned for cardiac cath.  - Cath in AM  - Continue antiplatelet, statin, heparin drip    Hypokalemia  Could be related to patient's home chlorthalidone. Stopped the medication.   - Continue to replete potassium, trend K. Note Mg WNL   68 year-old man with HTN, HLD, presented for evaluation after episode of diaphoresis and neck discomfort, found to have markedly elevated troponin in ER. EKG with SR, signs of old inf wall MI. Admitted to [facility].    NSTEMI  Now asymptomatic. Trops downtrending. No significant events on tele. CXR clear. TTE done. Left ventricle normal in size, wall thickness, wall motion and contractility. Estimated left ventricular ejection fraction 55-60%. Normal appearing right ventricle structure and function. No evidence of pericardial effusion. Underwent L heart cardiac cath today with findings of 3-vessel CAD, referred for CABG. Patient for transfer to [facility].   - Continue antiplatelet, statin, heparin drip  - CABG evaluation at [facility]    Hypokalemia  Could be related to patient's home chlorthalidone. Stopped the medication. Ordered for potassium supplementation. Note Mg level is WNL.   - Continue to replete potassium, trend potassium level    Hypophosphatemia  Phos 1.4 on routine labs today. Ordered for K-phos 30mmol.  - Continue to replete phos, trend phos level     HTN  BP controlled. BP 120s-80s today. On losartan.  - Continue losartan

## 2022-07-14 NOTE — CHART NOTE - NSCHARTNOTEFT_GEN_A_CORE
[facility] risks, benefits and alternatives discussed with patient. Risk discussed included, but not limited to MI, stroke, mortality, major bleeding, arrythmia, or infection. Consent obtained and signed educational material provided. Pt. verbalizes and understands pre-procedural instructions.  ASA: II   Bleeding Risk Score: 0.9  Creatinine: 0.93  GFR:  89  Julius score ROBERTO risk score 1 point

## 2022-07-14 NOTE — DISCHARGE NOTE PROVIDER - NSDCCPCAREPLAN_GEN_ALL_CORE_FT
PRINCIPAL DISCHARGE DIAGNOSIS  Diagnosis: NSTEMI (non-ST elevation myocardial infarction)  Assessment and Plan of Treatment: You were admitted to the hospital for diaphoresis (sweats) and neck discomfort, found to have non ST elevation myocardial infarction, a heart attack. Reassuringly, you are now comfortable, cardiac bloodwork enzymes (troponin) are down-trending, there have been no new cardiac events on the heart monitor, lungs are clear on exam and chest xray. Echocardiogram was performed and showed normal left and right ventricular heart function. Today you underwent a coronary angiogram that revealed multi vessel coronary disease and you have been recommened to receive inpatient evaluation for coronary artery bypass surgery. Stable for transfer to [facility]. Continue aspirin, statin, heparin drip.      SECONDARY DISCHARGE DIAGNOSES  Diagnosis: Hypokalemia  Assessment and Plan of Treatment: Of note, your bloodwork this admission has been significant for low potassium levels, likely related to your use of chlorthalidone as a blood pressure medication. We have stopped the medication and ordered  potassium supplementation. You will continue to need potassium supplementation and labwork to trend your potassium level once at [facility].    Diagnosis: Hypophosphatemia  Assessment and Plan of Treatment: Of note, your bloodwork this admission has been significant for low phosphorus levels. Today you were ordered for phosphorus supplementation. You will need repeat labwork to trend your phosphorus level once at [facility].      Diagnosis: HTN (hypertension)  Assessment and Plan of Treatment: Blood pressure is well controlled at this time on losartan. Note, we advise that your home chlorthalidone be discontinued. Blood pressure should be monitored further at [facility] and your regimen can be adjusted if needed.

## 2022-07-14 NOTE — PACU DISCHARGE NOTE - COMMENTS
Report given to Rosalee JOSEPH on 3E.  No bleeding noted from right groin cath site.  Pt with no complaints of pain, VSS,  Safety maintained, awaiting transport

## 2022-07-14 NOTE — DISCHARGE NOTE PROVIDER - NSDCMRMEDTOKEN_GEN_ALL_CORE_FT
acetaminophen 325 mg oral tablet: 2 tab(s) orally every 6 hours, As needed, Temp greater or equal to 38C (100.4F), Mild Pain (1 - 3)  aspirin 81 mg oral tablet, chewable: 1 tab(s) orally once a day  atorvastatin 80 mg oral tablet: 1 tab(s) orally once a day (at bedtime)  cyclobenzaprine 5 mg oral tablet: 1 tab(s) orally once a day (at bedtime), As needed, Spasm  heparin 100 units/mL-D5% intravenous solution: intravenous continuously as per normogram  losartan 100 mg oral tablet: 1 tab(s) orally once a day  polyethylene glycol 3350 oral powder for reconstitution: 17 gram(s) orally once a day, As needed, Constipation  traMADol 50 mg oral tablet: 0.5 tab(s) orally every 6 hours, As needed, Moderate Pain (4 - 6)   acetaminophen 325 mg oral tablet: 2 tab(s) orally every 6 hours, As needed, Temp greater or equal to 38C (100.4F), Mild Pain (1 - 3)  aspirin 81 mg oral tablet, chewable: 1 tab(s) orally once a day  atorvastatin 80 mg oral tablet: 1 tab(s) orally once a day (at bedtime)  cyclobenzaprine 5 mg oral tablet: 1 tab(s) orally once a day (at bedtime), As needed, Spasm  losartan 100 mg oral tablet: 1 tab(s) orally once a day  polyethylene glycol 3350 oral powder for reconstitution: 17 gram(s) orally once a day, As needed, Constipation  traMADol 50 mg oral tablet: 0.5 tab(s) orally every 6 hours, As needed, Moderate Pain (4 - 6)

## 2022-07-14 NOTE — PROGRESS NOTE ADULT - SUBJECTIVE AND OBJECTIVE BOX
Nurse Practitioner Progress note:     HPI:  67y Male with a past medical history of hypertension hyperlipidemia admitted for non-STEMI.  Cardiac enzymes trending down.  Symptoms started on Sunday, likely initial event.    s/p LHC today         Vital Signs  T(C): 36.8 (07-14-22 @ 09:00), Max: 37.6 (07-13-22 @ 19:15)  HR: 73 (07-14-22 @ 11:19) (63 - 80)  BP: 140/59 (07-14-22 @ 11:19) (105/54 - 149/92)  RR: 16 (07-14-22 @ 11:19) (16 - 18)  SpO2: 96% (07-14-22 @ 11:19) (96% - 98%)  Wt(kg): --        PHYSICAL EXAM:  NEURO: Non-focal, AxOx3.  No neuro deficits   CHEST/LUNG: Clear to auscultation bilaterally; No wheeze  HEART: s1 s2 Regular rate and rhythm; No murmurs, rubs, or gallops  ABDOMEN: Soft, Nontender, Nondistended; Bowel sounds present X 4 quadrants   EXTREMITIES:  2+ Peripheral Pulses, No clubbing, cyanosis, or edema   VASCULAR: Peripheral pulses palpable 2+ bilaterally  PROCEDURE SITE:  RFA accessed, sheath removed in RR by RN., Site is without hematoma or bleeding. Sensation and TITA intact. Distal pulses palpable 2+, capillary refill < 2 seconds. Patient denies pain, numbness, tingling, CP or SOB. Clean dry dressing applied     PROCEDURE RESULTS: full report to follow     ASSESSMENT: 67y Male with a past medical history of hypertension hyperlipidemia admitted for non-STEMI. Cardiac enzymes trending down.  Symptoms started on Sunday, likely initial event.    s/p LHC revealing multi vessel disease; CABG recommended     PLAN:  -VS, diet, activity as per post cath orders  - IVF per ROBERTO protocol   -Encourage PO fluids  -Continue current medications  - stop plavix   -Post cath instructions reviewed, patient verbalizes and understands instructions  --Plan of care discussed with patient, RN and Dr. Watson  - further plan of care pending discussion amongst Dr. Watson and patient's family  - continue electrolyte repletion   - rest of care per primary team        Nurse Practitioner Progress note:     HPI:  67y Male with a past medical history of hypertension hyperlipidemia admitted for non-STEMI.  Cardiac enzymes trending down.  Symptoms started on Sunday, likely initial event.    s/p LHC today         Vital Signs  T(C): 36.8 (07-14-22 @ 09:00), Max: 37.6 (07-13-22 @ 19:15)  HR: 73 (07-14-22 @ 11:19) (63 - 80)  BP: 140/59 (07-14-22 @ 11:19) (105/54 - 149/92)  RR: 16 (07-14-22 @ 11:19) (16 - 18)  SpO2: 96% (07-14-22 @ 11:19) (96% - 98%)  Wt(kg): --    PHYSICAL EXAM:  NEURO: Non-focal, AxOx3.  No neuro deficits   CHEST/LUNG: Clear to auscultation bilaterally; No wheeze  HEART: s1 s2 Regular rate and rhythm; No murmurs, rubs, or gallops  ABDOMEN: Soft, Nontender, Nondistended; Bowel sounds present X 4 quadrants   EXTREMITIES:  2+ Peripheral Pulses, No clubbing, cyanosis, or edema   VASCULAR: Peripheral pulses palpable 2+ bilaterally  PROCEDURE SITE:  RFA accessed, sheath removed in RR by RN., Site is without hematoma or bleeding. Sensation and TITA intact. Distal pulses palpable 2+, capillary refill < 2 seconds. Patient denies pain, numbness, tingling, CP or SOB. Clean dry dressing applied     PROCEDURE RESULTS: full report to follow     ASSESSMENT: 67y Male with a past medical history of hypertension hyperlipidemia admitted for non-STEMI. Cardiac enzymes trending down.  Symptoms started on Sunday, likely initial event.    s/p LHC revealing multi vessel disease; CABG recommended     PLAN:  -VS, diet, activity as per post cath orders  - IVF per ROBERTO protocol   -Encourage PO fluids  -Continue current medications  - stop plavix   -discontinue hep gtt   -Post cath instructions reviewed, patient verbalizes and understands instructions  --Plan of care discussed with patient, RN and Dr. Watson  - transfer to [facility] for CABG eval under Dr. Smith   -transfer consent signed   - continue electrolyte repletion   - rest of care per primary team

## 2022-07-14 NOTE — DISCHARGE NOTE NURSING/CASE MANAGEMENT/SOCIAL WORK - PATIENT PORTAL LINK FT
You can access the FollowMyHealth Patient Portal offered by [facility] by registering at the following website: [email or web address]. By joining [employer]’s FollowMyHealth portal, you will also be able to view your health information using other applications (apps) compatible with our system.

## 2022-07-14 NOTE — DISCHARGE NOTE PROVIDER - HOSPITAL COURSE
68 year-old man with HTN, HLD, presented for evaluation after episode of diaphoresis and neck discomfort, found to have markedly elevated troponin in ER. EKG with SR, signs of old inf wall MI. Admitted to [facility].    NSTEMI  Now asymptomatic. Troponin downtrending. No significant events on tele. CXR clear. TTE done. Left ventricle normal in size, wall thickness, wall motion and contractility. Estimated left ventricular ejection fraction 55-60%. Normal appearing right ventricle structure and function. No evidence of pericardial effusion. Underwent L heart cardiac cath today with findings of 3-vessel CAD, referred for CABG. Patient for transfer to [facility].   - Continue antiplatelet, statin, heparin drip  - CABG evaluation at [facility]    Hypokalemia  Could be related to patient's home chlorthalidone. Stopped the medication. Ordered for potassium supplementation. Note Mg level is WNL.   - Continue to replete potassium, trend potassium level    Hypophosphatemia  Phos 1.4 on routine labs today. Ordered for K-phos 30mmol.  - Continue to replete phos, trend phos level     HTN  BP controlled. BP 120s-80s today. On losartan.  - Continue losartan      Discharge Exam:  Afebrile  BP 120s/70s  HR 70s  RR 16-18  O2 99% on RA  GENERAL APPEARANCE:  No acute distress  HEAD: Normocephalic, atraumatic  NECK: Supple, no jugular venous distention, no carotid bruit  HEART: Regular rate and rhythm, S1, S2 normal, 1/6 murmur  CHEST: No anterior chest wall tenderness. Lungs clear to auscultation, without any wheezing, rhonchi or rales  ABDOMEN: Soft, nontender, nondistended, with positive bowel sounds appreciated  EXTREMITIES: No edema. No tenderness.   SKIN: Warm, dry, intact  NEURO: Alert and oriented x3. No gross deficits.   PSYCH:  Normal affect    Labs:                       13.0   12.04 )---------( 231             39.2       136  |  101  |  23  ---------------------< 100  2.9   |   30   |  1.00    Ca 8.5  Phos 1.4   Mg 2.8    TPro  6.5  /  Alb  3.1  /  TBili  2.6  /  DBili  x   /  AST  118  /  ALT  94  /  AlkPhos  81     PT: 15.2 sec;   INR: 1.31 ratio  PTT: 60.1 sec    Troponin 15,757 --> 16,966 --> 13,542     proBNP 2117    Micro:  COVID19, flu and RSV PCR negative    Imaging:  CXR 7/12: Clear lungs, borderline enlarged cardiac silhouette    Cardiac Testing:  [facility] 7/14: 3-vessel CAD. Full report pending.    TTE 7/13:  Mild to moderate mitral regurgitation. Mild aortic sclerosis with normal valvular opening. Mild aortic regurgitation. Mild tricuspid valve regurgitation. Left ventricle normal in size, wall thickness, wall motion and contractility. Estimated left ventricular ejection fraction is 55-60 %. Normal appearing right ventricle structure and function. No evidence of pericardial effusion.    Tele 7/13: Sinus rhythm with PVCs    ECG 7/12: Sinus rhythm old inferior wall MI   68 year-old man with HTN, HLD, presented for evaluation after episode of diaphoresis and neck discomfort, found to have markedly elevated troponin in ER. EKG with SR, signs of old inf wall MI. Admitted to [place].    NSTEMI  Now asymptomatic. S/P IV heparin drip. Troponin downtrending. No significant events on tele. CXR clear. TTE done. Left ventricle normal in size, wall thickness, wall motion and contractility. Estimated left ventricular ejection fraction 55-60%. Normal appearing right ventricle structure and function. No evidence of pericardial effusion. Underwent L heart cardiac cath today with findings of 3-vessel CAD, referred for CABG. Patient for transfer to [facility].   - Continue antiplatelet, statin  - CABG evaluation at [facility]    Hypokalemia  Could be related to patient's home chlorthalidone. Stopped the medication. Ordered for potassium supplementation. Note Mg level is WNL.   - Continue to replete potassium, trend potassium level    Hypophosphatemia  Phos 1.4 on routine labs today. Ordered for K-phos 30mmol.  - Continue to replete phos, trend phos level     HTN  BP controlled. BP 120s-80s today. On losartan.  - Continue losartan      Discharge Exam:  Afebrile  BP 120s/70s  HR 70s  RR 16-18  O2 99% on RA  GENERAL APPEARANCE:  No acute distress  HEAD: Normocephalic, atraumatic  NECK: Supple, no jugular venous distention, no carotid bruit  HEART: Regular rate and rhythm, S1, S2 normal, 1/6 murmur  CHEST: No anterior chest wall tenderness. Lungs clear to auscultation, without any wheezing, rhonchi or rales  ABDOMEN: Soft, nontender, nondistended, with positive bowel sounds appreciated  EXTREMITIES: No edema. No tenderness.   SKIN: Warm, dry, intact  NEURO: Alert and oriented x3. No gross deficits.   PSYCH:  Normal affect    Labs:                       13.0   12.04 )---------( 231             39.2       136  |  101  |  23  ---------------------< 100  2.9   |   30   |  1.00    Ca 8.5  Phos 1.4   Mg 2.8    TPro  6.5  /  Alb  3.1  /  TBili  2.6  /  DBili  x   /  AST  118  /  ALT  94  /  AlkPhos  81     PT: 15.2 sec;   INR: 1.31 ratio  PTT: 60.1 sec    Troponin 15,757 --> 16,966 --> 13,542     proBNP 2117    Micro:  COVID19, flu and RSV PCR negative    Imaging:  CXR 7/12: Clear lungs, borderline enlarged cardiac silhouette    Cardiac Testing:  [facility] 7/14: 3-vessel CAD. Full report pending.    TTE 7/13:  Mild to moderate mitral regurgitation. Mild aortic sclerosis with normal valvular opening. Mild aortic regurgitation. Mild tricuspid valve regurgitation. Left ventricle normal in size, wall thickness, wall motion and contractility. Estimated left ventricular ejection fraction is 55-60 %. Normal appearing right ventricle structure and function. No evidence of pericardial effusion.    Tele 7/13: Sinus rhythm with PVCs    ECG 7/12: Sinus rhythm old inferior wall MI

## 2022-07-14 NOTE — DISCHARGE NOTE PROVIDER - PROVIDER TOKENS
PROVIDER:[TOKEN:[[id number]],FOLLOWUP:[2 weeks]],PROVIDER:[TOKEN:[[id number]],FOLLOWUP:[2 weeks]]

## 2022-07-14 NOTE — DISCHARGE NOTE PROVIDER - NSDCQMBETAOTHER_CARD_A_CORE_FT
HR 60s without beta blocker, deferring to Cardiology, patient for transfer to [facility] inpatient for CABG eval

## 2022-07-14 NOTE — DISCHARGE NOTE PROVIDER - NSDCCAREPROVSEEN_GEN_ALL_CORE_FT
Chilo Watson (Cardiology)  Melina Perera (Cardiology)  Viv Whitman (Hospital Medicine)  Ana M Deras (Hospital Medicine)  Juan Lakhani (Cardiology)  Mp Heller (Hospital Medicine)  Kee Garza ([facility] Medicine)

## 2022-07-21 DIAGNOSIS — R74.01 ELEVATION OF LEVELS OF LIVER TRANSAMINASE LEVELS: ICD-10-CM

## 2022-07-21 DIAGNOSIS — I21.4 NON-ST ELEVATION (NSTEMI) MYOCARDIAL INFARCTION: ICD-10-CM

## 2022-07-21 DIAGNOSIS — E83.39 OTHER DISORDERS OF PHOSPHORUS METABOLISM: ICD-10-CM

## 2022-07-21 DIAGNOSIS — E87.6 HYPOKALEMIA: ICD-10-CM

## 2022-07-21 DIAGNOSIS — M17.11 UNILATERAL PRIMARY OSTEOARTHRITIS, RIGHT KNEE: ICD-10-CM

## 2022-07-21 DIAGNOSIS — I25.10 ATHEROSCLEROTIC HEART DISEASE OF NATIVE CORONARY ARTERY WITHOUT ANGINA PECTORIS: ICD-10-CM

## 2022-07-21 DIAGNOSIS — Z87.891 PERSONAL HISTORY OF NICOTINE DEPENDENCE: ICD-10-CM

## 2022-07-21 DIAGNOSIS — E78.5 HYPERLIPIDEMIA, UNSPECIFIED: ICD-10-CM

## 2022-07-21 DIAGNOSIS — I10 ESSENTIAL (PRIMARY) HYPERTENSION: ICD-10-CM

## 2022-07-21 DIAGNOSIS — R73.9 HYPERGLYCEMIA, UNSPECIFIED: ICD-10-CM

## 2022-10-18 NOTE — ASU PREOP CHECKLIST - BLOOD AVAILABLE
The patient has been examined and the H&P has been reviewed:    I concur with the findings and no changes have occurred since H&P was written.    Surgery risks, benefits and alternative options discussed and understood by patient/family.          There are no hospital problems to display for this patient.     n/a

## 2022-12-28 NOTE — DISCHARGE NOTE PROVIDER - CARE PROVIDER_API CALL
Form faxed to provided number of , [place] updated to watch for the form   [facility]  Internal Medicine  [address] [place] [address]  Phone: ([phone]  Fax: ()-  Follow Up Time: 2 weeks    Chilo Watson (MD)  Cardiology; Interventional Cardiology  [address], [place] [address]  Phone: [phone]  Fax: [phone]  Follow Up Time: 2 weeks

## 2023-03-09 NOTE — DISCHARGE NOTE NURSING/CASE MANAGEMENT/SOCIAL WORK - NSDCPETBCESMAN_GEN_ALL_CORE
If you are a smoker, it is important for your health to stop smoking. Please be aware that second hand smoke is also harmful.
IV discontinued, cath removed intact

## 2023-03-15 NOTE — ED ADULT NURSE NOTE - AGENT'S NAME
spouse - Hilda Mackey Additional Notes: Biopsy aftercare handout provided Detail Level: Zone Render Risk Assessment In Note?: no

## 2024-04-15 NOTE — ED ADULT TRIAGE NOTE - WEIGHT IN KG
What Type Of Note Output Would You Prefer (Optional)?: Bullet Format
Is This A New Presentation, Or A Follow-Up?: Skin Lesions
88